# Patient Record
Sex: MALE | Race: WHITE | NOT HISPANIC OR LATINO | Employment: FULL TIME | URBAN - METROPOLITAN AREA
[De-identification: names, ages, dates, MRNs, and addresses within clinical notes are randomized per-mention and may not be internally consistent; named-entity substitution may affect disease eponyms.]

---

## 2018-06-21 ENCOUNTER — OFFICE VISIT (OUTPATIENT)
Dept: URGENT CARE | Facility: CLINIC | Age: 33
End: 2018-06-21
Payer: COMMERCIAL

## 2018-06-21 VITALS
DIASTOLIC BLOOD PRESSURE: 80 MMHG | TEMPERATURE: 98.2 F | HEART RATE: 82 BPM | SYSTOLIC BLOOD PRESSURE: 122 MMHG | RESPIRATION RATE: 18 BRPM | WEIGHT: 188 LBS | OXYGEN SATURATION: 98 % | BODY MASS INDEX: 28.49 KG/M2 | HEIGHT: 68 IN

## 2018-06-21 DIAGNOSIS — R11.2 NON-INTRACTABLE VOMITING WITH NAUSEA, UNSPECIFIED VOMITING TYPE: Primary | ICD-10-CM

## 2018-06-21 PROCEDURE — 99283 EMERGENCY DEPT VISIT LOW MDM: CPT | Performed by: FAMILY MEDICINE

## 2018-06-21 PROCEDURE — G0382 LEV 3 HOSP TYPE B ED VISIT: HCPCS | Performed by: FAMILY MEDICINE

## 2018-06-21 NOTE — LETTER
June 21, 2018     Patient: Samreen Romero   YOB: 1985   Date of Visit: 6/21/2018       To Whom It May Concern: It is my medical opinion that Alison Palafox may return to work on 06/22/2018  If you have any questions or concerns, please don't hesitate to call           Sincerely,        Cathy Hill MD    CC: No Recipients

## 2018-06-21 NOTE — PATIENT INSTRUCTIONS
As we discussed, it does not appear that the tick you picked off your body had time to feed  It is highly unlikely that it had time to transmit Lyme  With regard to the GI bug, it appears that it has pretty much run its course  Be sure to stay well hydrated

## 2018-06-21 NOTE — PROGRESS NOTES
Assessment/Plan:      Diagnoses and all orders for this visit:    Non-intractable vomiting with nausea, unspecified vomiting type          Subjective:     Patient ID: Tai Galarza is a 29 y o  male  Patient is a 80-year-old male who presents with the a GI bug which began yesterday  States he vomited twice yesterday  This morning he was nauseated but was able to keep the the flat soda down  He also picked a tick off his scrotum which he believes had just attached itself  It was very small, and was not engorged  Review of Systems   Constitutional: Negative  HENT: Negative  Eyes: Negative  Respiratory: Negative  Gastrointestinal: Positive for nausea  Objective:     Physical Exam   Constitutional: He is oriented to person, place, and time  He appears well-developed and well-nourished  HENT:   Head: Normocephalic and atraumatic  Eyes: Conjunctivae are normal    Pulmonary/Chest: Effort normal    Musculoskeletal: Normal range of motion  Neurological: He is alert and oriented to person, place, and time

## 2018-07-29 ENCOUNTER — OFFICE VISIT (OUTPATIENT)
Dept: URGENT CARE | Facility: CLINIC | Age: 33
End: 2018-07-29
Payer: COMMERCIAL

## 2018-07-29 VITALS
OXYGEN SATURATION: 99 % | HEART RATE: 85 BPM | BODY MASS INDEX: 27.85 KG/M2 | HEIGHT: 69 IN | WEIGHT: 188 LBS | SYSTOLIC BLOOD PRESSURE: 120 MMHG | TEMPERATURE: 99.1 F | RESPIRATION RATE: 16 BRPM | DIASTOLIC BLOOD PRESSURE: 82 MMHG

## 2018-07-29 DIAGNOSIS — T63.481A INSECT STINGS, ACCIDENTAL OR UNINTENTIONAL, INITIAL ENCOUNTER: Primary | ICD-10-CM

## 2018-07-29 PROCEDURE — 96372 THER/PROPH/DIAG INJ SC/IM: CPT | Performed by: NURSE PRACTITIONER

## 2018-07-29 PROCEDURE — G0382 LEV 3 HOSP TYPE B ED VISIT: HCPCS | Performed by: NURSE PRACTITIONER

## 2018-07-29 PROCEDURE — 99283 EMERGENCY DEPT VISIT LOW MDM: CPT | Performed by: NURSE PRACTITIONER

## 2018-07-29 RX ORDER — DIPHENHYDRAMINE HYDROCHLORIDE 50 MG/ML
50 INJECTION INTRAMUSCULAR; INTRAVENOUS ONCE
Status: COMPLETED | OUTPATIENT
Start: 2018-07-29 | End: 2018-07-29

## 2018-07-29 RX ORDER — METHYLPREDNISOLONE SODIUM SUCCINATE 125 MG/2ML
125 INJECTION, POWDER, LYOPHILIZED, FOR SOLUTION INTRAMUSCULAR; INTRAVENOUS ONCE
Status: COMPLETED | OUTPATIENT
Start: 2018-07-29 | End: 2018-07-29

## 2018-07-29 RX ADMIN — METHYLPREDNISOLONE SODIUM SUCCINATE 125 MG: 125 INJECTION, POWDER, LYOPHILIZED, FOR SOLUTION INTRAMUSCULAR; INTRAVENOUS at 16:36

## 2018-07-29 RX ADMIN — DIPHENHYDRAMINE HYDROCHLORIDE 50 MG: 50 INJECTION INTRAMUSCULAR; INTRAVENOUS at 16:38

## 2018-07-29 NOTE — PROGRESS NOTES
NAME: Mona Engel is a 29 y o  male  : 1985    MRN: [id number]      Assessment and Plan   Insect stings, accidental or unintentional, initial encounter [T63 341A]  1  Insect stings, accidental or unintentional, initial encounter  methylPREDNISolone sodium succinate (Solu-MEDROL) injection 125 mg    diphenhydrAMINE (BENADRYL) injection 50 mg     Administrations This Visit     diphenhydrAMINE (BENADRYL) injection 50 mg     Admin Date  2018 Action  Given Dose  50 mg Route  Intramuscular Administered By  Audra Walker RN          methylPREDNISolone sodium succinate (Solu-MEDROL) injection 125 mg     Admin Date  2018 Action  Given Dose  125 mg Route  Intramuscular Administered By  Audra Walker, SAMUEL Patel was seen today for insect bite  Diagnoses and all orders for this visit:    Insect stings, accidental or unintentional, initial encounter  -     methylPREDNISolone sodium succinate (Solu-MEDROL) injection 125 mg; Inject 2 mL (125 mg total) into a muscle once   -     diphenhydrAMINE (BENADRYL) injection 50 mg; Inject 1 mL (50 mg total) into a muscle once     pt tolerated both shot, waited about 20 min after injections with no side effects  Pt aware not to drive with benadryl in system and wife driving patient home    Patient Instructions   Patient Instructions   Follow up with pcp   Take meds as directed   Steroid injection was given at 445pm and bendryl 50mg was given at 445pm  If symptoms worsen go to the ER  Monitor the right upper arm for worsening signs   Trouble breathing, talking or having chest discomfort go to the ER or call 911 immediately  Right upper arm was outlined with a skin marker  Take zyrtec or allegra in the morning      Proceed to ER if symptoms worsen      Chief Complaint     Chief Complaint   Patient presents with    Insect Bite     x4, today 1030; RUE and R jaw; redness, swelling; pt denies any issues with breathing; no OTC meds History of Present Illness     Patient here today for right arm pain after being stung by 4 bees  He has been stung many times before in the past and no reactions  He has no SOB, Cp, wheezing or difficulty swallowing, however the right arm is red warm and swollen  Pt was stung around 10AM this morning when doing work out side and outline with a pen where the redness stopped but as a few hours went by he noticed that it has gotten bigger  He has no fever, has no other symptoms  The area of the right shoulder and upper arm are red, warm, and swollen  He has good ROM of the RUE  Wife is here today with his two younger kids in car waiting  Pt is not driving home  Review of Systems   Review of Systems   Skin: Positive for wound  Current Medications     No current outpatient prescriptions on file  No current facility-administered medications for this visit  Current Allergies     Allergies as of 07/29/2018    (No Known Allergies)              History reviewed  No pertinent past medical history  Past Surgical History:   Procedure Laterality Date    DENTAL SURGERY      VASECTOMY  2018       No family history on file  Medications have been verified  The following portions of the patient's history were reviewed and updated as appropriate: allergies, current medications, past family history, past medical history, past social history, past surgical history and problem list     Objective   /82   Pulse 85   Temp 99 1 °F (37 3 °C) (Tympanic)   Resp 16   Ht 5' 9" (1 753 m)   Wt 85 3 kg (188 lb)   SpO2 99%   BMI 27 76 kg/m²      Physical Exam     Physical Exam   Constitutional: He appears well-developed and well-nourished  Musculoskeletal:        Right shoulder: He exhibits tenderness and swelling  He exhibits normal range of motion and no pain  Arms:  Skin:        prior to leaving and after meds, redness of the right upper arm started to fade in color      Rachael Carter, CRNP

## 2018-07-29 NOTE — LETTER
July 29, 2018     Patient: Dmitriy Jackson   YOB: 1985   Date of Visit: 7/29/2018       To Whom It May Concern: It is my medical opinion that Mickeal Mom may return to work on 07/31/2018  If you have any questions or concerns, please don't hesitate to call           Sincerely,        PRASAD Lee    CC: No Recipients

## 2018-07-29 NOTE — PATIENT INSTRUCTIONS
Follow up with pcp   Take meds as directed   Steroid injection was given at 445pm and bendryl 50mg was given at 445pm  If symptoms worsen go to the ER  Monitor the right upper arm for worsening signs   Trouble breathing, talking or having chest discomfort go to the ER or call 911 immediately  Right upper arm was outlined with a skin marker  Take zyrtec or allegra in the morning

## 2019-08-08 ENCOUNTER — OFFICE VISIT (OUTPATIENT)
Dept: FAMILY MEDICINE CLINIC | Facility: CLINIC | Age: 34
End: 2019-08-08
Payer: COMMERCIAL

## 2019-08-08 VITALS
HEIGHT: 69 IN | TEMPERATURE: 96.8 F | WEIGHT: 194 LBS | SYSTOLIC BLOOD PRESSURE: 102 MMHG | RESPIRATION RATE: 18 BRPM | DIASTOLIC BLOOD PRESSURE: 82 MMHG | BODY MASS INDEX: 28.73 KG/M2 | HEART RATE: 60 BPM

## 2019-08-08 DIAGNOSIS — F41.9 ANXIETY: ICD-10-CM

## 2019-08-08 DIAGNOSIS — R63.5 WEIGHT GAIN: ICD-10-CM

## 2019-08-08 DIAGNOSIS — Z13.6 SCREENING FOR CARDIOVASCULAR CONDITION: Primary | ICD-10-CM

## 2019-08-08 PROCEDURE — 3008F BODY MASS INDEX DOCD: CPT | Performed by: NURSE PRACTITIONER

## 2019-08-08 PROCEDURE — 1036F TOBACCO NON-USER: CPT | Performed by: NURSE PRACTITIONER

## 2019-08-08 PROCEDURE — 99203 OFFICE O/P NEW LOW 30 MIN: CPT | Performed by: NURSE PRACTITIONER

## 2019-08-08 RX ORDER — ESCITALOPRAM OXALATE 5 MG/1
5 TABLET ORAL DAILY
Qty: 30 TABLET | Refills: 2 | Status: SHIPPED | OUTPATIENT
Start: 2019-08-08 | End: 2019-10-14

## 2019-08-08 NOTE — PROGRESS NOTES
Assessment/Plan   Diagnoses and all orders for this visit:    Screening for cardiovascular condition  -     CBC and differential; Future  -     Comprehensive metabolic panel; Future  -     Lipid panel; Future  -     CBC and differential  -     Comprehensive metabolic panel  -     Lipid panel    Weight gain  -     TSH, 3rd generation with Free T4 reflex; Future  -     TSH, 3rd generation with Free T4 reflex    Anxiety  -     escitalopram (LEXAPRO) 5 mg tablet; Take 1 tablet (5 mg total) by mouth daily        Chief Complaint   Patient presents with    Establish Care     Problem:  Anxiety       Subjective   Patient ID: Dimitrios Nino is a 36 y o  male  Vitals:    08/08/19 1433   BP: 102/82   Pulse: 60   Resp: 18   Temp: (!) 96 8 °F (36 °C)     Here today to establish care with this practice  Reports a past history of anxiety without depression  At "one time I was on medications " but only a short time and 'no one regulated the dose" so "I went off  Of it"  Reports his spouse is a patient at this practice who is also being seen for anxiety  He is a , works third shift  And has 4 children  "life gets a little stressful at times"  He is deferring counselor at this time but will consider it in the future once he is established on medications  He has no other c/o at this time, reports be is trying to loose weight with healthy eating  Anxiety   Presents for initial visit  Onset was 6 to 12 months ago  The problem has been unchanged  Symptoms include decreased concentration, dizziness (at times"feel a slight dizzy " with turning of head, only lasts a second or two and resolves), excessive worry, feeling of choking, irritability, nervous/anxious behavior, palpitations and restlessness  Patient reports no chest pain, compulsions, confusion, depressed mood, impotence, insomnia, muscle tension, nausea, shortness of breath or suicidal ideas  Symptoms occur most days  The severity of symptoms is moderate  The symptoms are aggravated by family issues, work stress and caffeine  The patient sleeps 8 hours per night  The quality of sleep is good  Nighttime awakenings: none  There are no known risk factors  His past medical history is significant for anxiety/panic attacks  There is no history of anemia, fibromyalgia, hyperthyroidism or suicide attempts  Past treatments include SSRIs  The treatment provided mild relief  Compliance with prior treatments has been variable  The following portions of the patient's history were reviewed and updated as appropriate: allergies, current medications, past family history, past medical history, past social history and problem list     Review of Systems   Constitutional: Positive for irritability  Negative for fatigue and fever  HENT: Negative  Eyes: Negative  Respiratory: Negative  Negative for shortness of breath  Cardiovascular: Positive for palpitations  Negative for chest pain  Gastrointestinal: Negative  Negative for nausea  Endocrine: Negative  Genitourinary: Negative  Negative for impotence  Musculoskeletal: Negative  Negative for arthralgias and myalgias  Skin: Negative  Allergic/Immunologic: Negative  Neurological: Positive for dizziness (at times"feel a slight dizzy " with turning of head, only lasts a second or two and resolves)  Hematological: Negative  Psychiatric/Behavioral: Positive for decreased concentration  Negative for confusion and suicidal ideas  The patient is nervous/anxious  The patient does not have insomnia  Objective     Physical Exam   Constitutional: He is oriented to person, place, and time  He appears well-developed and well-nourished  No distress  HENT:   Head: Normocephalic and atraumatic  Right Ear: External ear normal    Left Ear: External ear normal    Nose: Nose normal    Mouth/Throat: Oropharynx is clear and moist  No oropharyngeal exudate     Eyes: Pupils are equal, round, and reactive to light  Conjunctivae and EOM are normal  Right eye exhibits no discharge  Left eye exhibits no discharge  Neck: Normal range of motion  Neck supple  Cardiovascular: Normal rate, regular rhythm, normal heart sounds and intact distal pulses  No murmur heard  Pulmonary/Chest: Effort normal and breath sounds normal  No respiratory distress  Abdominal: Soft  Bowel sounds are normal  He exhibits no distension and no mass  There is no tenderness  There is no rebound and no guarding  Musculoskeletal: Normal range of motion  He exhibits no edema, tenderness or deformity  Neurological: He is alert and oriented to person, place, and time  He displays normal reflexes  No cranial nerve deficit  He exhibits normal muscle tone  Coordination normal    Skin: Skin is warm and dry  Capillary refill takes less than 2 seconds  He is not diaphoretic  No erythema  Psychiatric: He has a normal mood and affect  Judgment and thought content normal    Nursing note and vitals reviewed    No Known Allergies

## 2019-08-08 NOTE — PATIENT INSTRUCTIONS
Anxiety   WHAT YOU NEED TO KNOW:   Anxiety is a condition that causes you to feel extremely worried or nervous  The feelings are so strong that they can cause problems with your daily activities or sleep  Anxiety may be triggered by something you fear, or it may happen without a cause  Family or work stress, smoking, caffeine, and alcohol can increase your risk for anxiety  Certain medicines or health conditions can also increase your risk  Anxiety can become a long-term condition if it is not managed or treated  DISCHARGE INSTRUCTIONS:   Call 911 if:   · You have chest pain, tightness, or heaviness that may spread to your shoulders, arms, jaw, neck, or back  · You feel like hurting yourself or someone else  Contact your healthcare provider if:   · Your symptoms get worse or do not get better with treatment  · Your anxiety keeps you from doing your regular daily activities  · You have new symptoms since your last visit  · You have questions or concerns about your condition or care  Medicines:   · Medicines  may be given to help you feel more calm and relaxed, and decrease your symptoms  · Take your medicine as directed  Contact your healthcare provider if you think your medicine is not helping or if you have side effects  Tell him of her if you are allergic to any medicine  Keep a list of the medicines, vitamins, and herbs you take  Include the amounts, and when and why you take them  Bring the list or the pill bottles to follow-up visits  Carry your medicine list with you in case of an emergency  Follow up with your healthcare provider within 2 weeks or as directed:  Write down your questions so you remember to ask them during your visits  Manage anxiety:   · Talk to someone about your anxiety  Your healthcare provider may suggest counseling  Cognitive behavioral therapy can help you understand and change how you react to events that trigger your symptoms   You might feel more comfortable talking with a friend or family member about your anxiety  Choose someone you know will be supportive and encouraging  · Find ways to relax  Activities such as exercise, meditation, or listening to music can help you relax  Spend time with friends, or do things you enjoy  · Practice deep breathing  Deep breathing can help you relax when you feel anxious  Focus on taking slow, deep breaths several times a day, or during an anxiety attack  Breathe in through your nose and out through your mouth  · Create a regular sleep routine  Regular sleep can help you feel calmer during the day  Go to sleep and wake up at the same times every day  Do not watch television or use the computer right before bed  Your room should be comfortable, dark, and quiet  · Eat a variety of healthy foods  Healthy foods include fruits, vegetables, low-fat dairy products, lean meats, fish, whole-grain breads, and cooked beans  Healthy foods can help you feel less anxious and have more energy  · Exercise regularly  Exercise can increase your energy level  Exercise may also lift your mood and help you sleep better  Your healthcare provider can help you create an exercise plan  · Do not smoke  Nicotine and other chemicals in cigarettes and cigars can increase anxiety  Ask your healthcare provider for information if you currently smoke and need help to quit  E-cigarettes or smokeless tobacco still contain nicotine  Talk to your healthcare provider before you use these products  · Do not have caffeine  Caffeine can make your symptoms worse  Do not have foods or drinks that are meant to increase your energy level  · Limit or do not drink alcohol  Ask your healthcare provider if alcohol is safe for you  You may not be able to drink alcohol if you take certain anxiety or depression medicines  Limit alcohol to 1 drink per day if you are a woman  Limit alcohol to 2 drinks per day if you are a man   A drink of alcohol is 12 ounces of beer, 5 ounces of wine, or 1½ ounces of liquor  · Do not use drugs  Drugs can make your anxiety worse  It can also make anxiety hard to manage  Talk to your healthcare provider if you use drugs and want help to quit  © 2017 [address] [employer] Information is for End User's use only and may not be sold, redistributed or otherwise used for commercial purposes  All illustrations and images included in CareNotes® are the copyrighted property of A D A M , [employer]  or Karsten Rosario  The above information is an  only  It is not intended as medical advice for individual conditions or treatments  Talk to your doctor, nurse or pharmacist before following any medical regimen to see if it is safe and effective for you

## 2019-08-30 ENCOUNTER — OFFICE VISIT (OUTPATIENT)
Dept: FAMILY MEDICINE CLINIC | Facility: CLINIC | Age: 34
End: 2019-08-30
Payer: COMMERCIAL

## 2019-08-30 VITALS
HEART RATE: 71 BPM | HEIGHT: 69 IN | OXYGEN SATURATION: 96 % | BODY MASS INDEX: 29.51 KG/M2 | DIASTOLIC BLOOD PRESSURE: 70 MMHG | TEMPERATURE: 98.2 F | WEIGHT: 199.2 LBS | SYSTOLIC BLOOD PRESSURE: 100 MMHG

## 2019-08-30 DIAGNOSIS — Z98.52 S/P VASECTOMY: ICD-10-CM

## 2019-08-30 DIAGNOSIS — F41.9 ANXIETY: Primary | ICD-10-CM

## 2019-08-30 PROCEDURE — 99213 OFFICE O/P EST LOW 20 MIN: CPT | Performed by: NURSE PRACTITIONER

## 2019-08-30 NOTE — PROGRESS NOTES
Assessment/Plan   Diagnoses and all orders for this visit:    Anxiety  Comments:  continue with lexapro 5 mg PO daily, follow up in 4 weeks    S/P vasectomy  -     Ambulatory referral to Urology; Future        Chief Complaint   Patient presents with    Follow-up     Follow up Anxiety       Subjective   Patient ID: Dionicio López is a 36 y o  male  Vitals:    08/30/19 1442   BP: 100/70   Pulse: 71   Temp: 98 2 °F (36 8 °C)   SpO2: 96%     Patient reports had a  vasectomy 2 years ago and has found that when he has increasing number of sexual intercourse with his wife he feels "a build up of pressure" in his right testicle, it is not painful,  Nor swollen, none today  Will send to DR Slime Pink for evaulation    Anxiety   Presents for follow-up visit  Symptoms include decreased concentration, insomnia and nervous/anxious behavior  Patient reports no irritability, malaise, muscle tension or suicidal ideas  Primary symptoms comment: "feeling must improved" "the little things have stopped bothering me"  Symptoms occur occasionally  The most recent episode lasted 2 hours  The severity of symptoms is moderate  The quality of sleep is good  Nighttime awakenings: none  Compliance with medications is %  Treatment side effects: none  The following portions of the patient's history were reviewed and updated as appropriate: allergies, current medications, past medical history, past social history, past surgical history and problem list     Review of Systems   Constitutional: Negative  Negative for fatigue, fever and irritability  HENT: Negative  Eyes: Negative  Respiratory: Negative  Cardiovascular: Negative  Gastrointestinal: Negative  Endocrine: Negative  Genitourinary: Negative  Musculoskeletal: Negative  Skin: Negative  Allergic/Immunologic: Negative  Neurological: Negative  Hematological: Negative  Psychiatric/Behavioral: Positive for decreased concentration  Negative for self-injury, sleep disturbance and suicidal ideas  The patient is nervous/anxious and has insomnia  Objective     Physical Exam   Constitutional: He is oriented to person, place, and time  He appears well-developed and well-nourished  No distress  HENT:   Head: Normocephalic and atraumatic  Mouth/Throat: No oropharyngeal exudate  Eyes: Conjunctivae are normal  Right eye exhibits no discharge  Left eye exhibits no discharge  Neck: Normal range of motion  Neck supple  No thyromegaly present  Cardiovascular: Normal rate, regular rhythm, normal heart sounds and intact distal pulses  No murmur heard  Pulmonary/Chest: Effort normal and breath sounds normal  No respiratory distress  Abdominal: Soft  Bowel sounds are normal  He exhibits no distension  There is no rebound and no guarding  Musculoskeletal: Normal range of motion  He exhibits no edema or tenderness  Lymphadenopathy:     He has no cervical adenopathy  Neurological: He is alert and oriented to person, place, and time  Skin: Skin is warm and dry  Capillary refill takes less than 2 seconds  He is not diaphoretic  No erythema  No pallor  Psychiatric: He has a normal mood and affect  His behavior is normal  Judgment and thought content normal    Nursing note and vitals reviewed  No Known Allergies  There is no problem list on file for this patient

## 2019-10-14 ENCOUNTER — OFFICE VISIT (OUTPATIENT)
Dept: FAMILY MEDICINE CLINIC | Facility: CLINIC | Age: 34
End: 2019-10-14
Payer: COMMERCIAL

## 2019-10-14 VITALS
TEMPERATURE: 96.9 F | SYSTOLIC BLOOD PRESSURE: 116 MMHG | BODY MASS INDEX: 29.47 KG/M2 | HEART RATE: 73 BPM | WEIGHT: 199 LBS | DIASTOLIC BLOOD PRESSURE: 78 MMHG | RESPIRATION RATE: 15 BRPM | HEIGHT: 69 IN | OXYGEN SATURATION: 98 %

## 2019-10-14 DIAGNOSIS — F41.9 ANXIETY: Primary | ICD-10-CM

## 2019-10-14 PROCEDURE — 3008F BODY MASS INDEX DOCD: CPT | Performed by: NURSE PRACTITIONER

## 2019-10-14 PROCEDURE — 99213 OFFICE O/P EST LOW 20 MIN: CPT | Performed by: NURSE PRACTITIONER

## 2019-10-14 RX ORDER — ESCITALOPRAM OXALATE 10 MG/1
10 TABLET ORAL DAILY
Qty: 30 TABLET | Refills: 1 | Status: SHIPPED | OUTPATIENT
Start: 2019-10-14 | End: 2020-01-08

## 2019-10-14 NOTE — PROGRESS NOTES
Assessment/Plan   Diagnoses and all orders for this visit:    Anxiety  -     Ambulatory referral to [facility]; Future  -     escitalopram (LEXAPRO) 10 mg tablet; Take 1 tablet (10 mg total) by mouth daily        Chief Complaint   Patient presents with    Follow-up     1 month follow up for anxiety  Subjective   Patient ID: Oneal Quinones is a 36 y o  male  There were no vitals filed for this visit  Reports having increased "issues", step dad commited suicide  1 year ago, beginning to have marital problems now due to my problems     Anxiety   Presents for follow-up visit  Symptoms include confusion, decreased concentration, depressed mood, irritability and nervous/anxious behavior  Patient reports no chest pain, dizziness, insomnia or suicidal ideas  Symptoms occur most days  The most recent episode lasted 2 hours  The severity of symptoms is moderate  The quality of sleep is fair  Nighttime awakenings: occasional      Compliance with medications is %  Treatment side effects: none        The following portions of the patient's history were reviewed and updated as appropriate: allergies, current medications, past family history, past social history, past surgical history and problem list     Review of Systems   Constitutional: Positive for irritability  HENT: Negative  Eyes: Negative  Respiratory: Negative  Cardiovascular: Negative  Negative for chest pain  Gastrointestinal: Negative  Endocrine: Negative  Genitourinary: Negative  Musculoskeletal: Negative  Skin: Negative  Allergic/Immunologic: Negative  Neurological: Negative  Negative for dizziness  Hematological: Negative  Psychiatric/Behavioral: Positive for confusion and decreased concentration  Negative for suicidal ideas  The patient is nervous/anxious  The patient does not have insomnia  Objective     Physical Exam   Constitutional: He is oriented to person, place, and time   He appears well-developed and well-nourished  No distress  HENT:   Head: Normocephalic and atraumatic  Eyes: Conjunctivae are normal  Right eye exhibits no discharge  Left eye exhibits no discharge  Neck: Normal range of motion  Neck supple  No thyromegaly present  Cardiovascular: Normal rate, regular rhythm, normal heart sounds and intact distal pulses  No murmur heard  Pulmonary/Chest: Effort normal and breath sounds normal  He has no wheezes  Abdominal: Soft  Bowel sounds are normal    Musculoskeletal: Normal range of motion  He exhibits no edema, tenderness or deformity  Lymphadenopathy:     He has no cervical adenopathy  Neurological: He is alert and oriented to person, place, and time  Skin: Skin is warm and dry  Capillary refill takes less than 2 seconds  No rash noted  He is not diaphoretic  No erythema  No pallor  Psychiatric: He has a normal mood and affect  His behavior is normal  Judgment and thought content normal    Nursing note and vitals reviewed    No Known Allergies

## 2019-11-08 ENCOUNTER — OFFICE VISIT (OUTPATIENT)
Dept: FAMILY MEDICINE CLINIC | Facility: CLINIC | Age: 34
End: 2019-11-08
Payer: COMMERCIAL

## 2019-11-08 VITALS
OXYGEN SATURATION: 97 % | TEMPERATURE: 97.1 F | RESPIRATION RATE: 15 BRPM | SYSTOLIC BLOOD PRESSURE: 120 MMHG | HEIGHT: 69 IN | WEIGHT: 200.2 LBS | DIASTOLIC BLOOD PRESSURE: 76 MMHG | HEART RATE: 70 BPM | BODY MASS INDEX: 29.65 KG/M2

## 2019-11-08 DIAGNOSIS — R51.9 ACUTE NONINTRACTABLE HEADACHE, UNSPECIFIED HEADACHE TYPE: ICD-10-CM

## 2019-11-08 DIAGNOSIS — J02.9 SORE THROAT: Primary | ICD-10-CM

## 2019-11-08 LAB — S PYO AG THROAT QL: NEGATIVE

## 2019-11-08 PROCEDURE — 99214 OFFICE O/P EST MOD 30 MIN: CPT | Performed by: NURSE PRACTITIONER

## 2019-11-08 PROCEDURE — 87880 STREP A ASSAY W/OPTIC: CPT | Performed by: NURSE PRACTITIONER

## 2019-11-08 PROCEDURE — 1036F TOBACCO NON-USER: CPT | Performed by: NURSE PRACTITIONER

## 2019-11-08 RX ORDER — AMOXICILLIN 875 MG/1
875 TABLET, COATED ORAL 2 TIMES DAILY
Qty: 14 TABLET | Refills: 0 | Status: SHIPPED | OUTPATIENT
Start: 2019-11-08 | End: 2019-11-15

## 2019-11-08 RX ORDER — NAPROXEN 500 MG/1
500 TABLET ORAL 2 TIMES DAILY WITH MEALS
Qty: 20 TABLET | Refills: 0 | Status: SHIPPED | OUTPATIENT
Start: 2019-11-08 | End: 2020-01-08 | Stop reason: ALTCHOICE

## 2019-11-08 NOTE — PROGRESS NOTES
Assessment/Plan   Problem List Items Addressed This Visit     None      Visit Diagnoses     Sore throat    -  Primary    Relevant Medications    amoxicillin (AMOXIL) 875 mg tablet    Other Relevant Orders    POCT rapid strepA (Completed)    Throat culture    Acute nonintractable headache, unspecified headache type        Relevant Medications    naproxen (NAPROSYN) 500 mg tablet                Chief Complaint   Patient presents with    Follow-up     for anxiety-pt has not taking lexapro in 2 weeks        Subjective   Patient ID: Carlos Doshi is a 36 y o  male  Vitals:    11/08/19 0937   BP: 120/76   Pulse: 70   Resp: 15   Temp: (!) 97 1 °F (36 2 °C)   SpO2: 97%     Reports several weeks ago began experiencing hot flashes and chills, sweating and thought it was from his Lexapro which she stopped  But he noticed that his symptoms only increased in severity without the Lexapro  He had 1 day of loose stools, felt dizzy, spent half a day in bed and then felt a little better  Until 4 days ago when he began with 1 day of fever, headaches, generalized achiness, and a sore throat  Headache    This is a new problem  The current episode started in the past 7 days (Four days ago)  The problem occurs intermittently  The problem has been waxing and waning  The pain is located in the frontal region  The pain does not radiate  The pain quality is not similar to prior headaches (No history of migraines)  The quality of the pain is described as throbbing  The pain is at a severity of 6/10  The pain is moderate  Associated symptoms include dizziness, a fever, muscle aches, neck pain (Left side of his neck), a sore throat and swollen glands   Pertinent negatives include no abdominal pain, abnormal behavior, back pain, blurred vision, coughing, drainage, ear pain, facial sweating, hearing loss, insomnia, loss of balance, nausea, phonophobia, photophobia, scalp tenderness, seizures, sinus pressure, tingling, tinnitus or weakness  Nothing aggravates the symptoms  Treatments tried: Advil migraine  The treatment provided moderate (But wore off in several hours) relief  There is no history of cancer, cluster headaches, hypertension, immunosuppression, migraine headaches, obesity, recent head traumas or sinus disease  The following portions of the patient's history were reviewed and updated as appropriate: allergies, current medications, past medical history, past social history, past surgical history and problem list     Review of Systems   Constitutional: Positive for appetite change, chills, fatigue and fever  HENT: Positive for sore throat  Negative for congestion, ear pain, hearing loss, postnasal drip, sinus pressure, sinus pain, tinnitus and trouble swallowing  Eyes: Negative  Negative for blurred vision and photophobia  Respiratory: Negative  Negative for cough  Cardiovascular: Negative  Gastrointestinal: Positive for abdominal distention and diarrhea (1 day )  Negative for abdominal pain and nausea  Endocrine: Negative  Genitourinary: Negative  Musculoskeletal: Positive for myalgias and neck pain (Left side of his neck)  Negative for back pain  Skin: Negative  Allergic/Immunologic: Negative  Neurological: Positive for dizziness and headaches  Negative for tingling, seizures, weakness and loss of balance  Hematological: Negative  Psychiatric/Behavioral: The patient is nervous/anxious  The patient does not have insomnia  Objective     Physical Exam   Constitutional: He is oriented to person, place, and time  He appears well-developed and well-nourished  No distress  HENT:   Head: Normocephalic and atraumatic  Right Ear: External ear normal    Left Ear: External ear normal    Nose: Nose normal    Mouth/Throat: Oropharyngeal exudate and posterior oropharyngeal erythema present  Tonsils are 1+ on the right  Tonsils are 1+ on the left  No tonsillar exudate     Eyes: Pupils are equal, round, and reactive to light  Conjunctivae and EOM are normal  Right eye exhibits no discharge  Left eye exhibits no discharge  Neck: Normal range of motion  Neck supple  No thyromegaly present  Cardiovascular: Normal rate, regular rhythm, normal heart sounds and intact distal pulses  Exam reveals no friction rub  No murmur heard  Pulmonary/Chest: Effort normal and breath sounds normal  No respiratory distress  He has no wheezes  Abdominal: Soft  Bowel sounds are normal  He exhibits no distension and no mass  There is no tenderness  There is no rebound and no guarding  Musculoskeletal: He exhibits tenderness  He exhibits no edema  Lymphadenopathy:     He has cervical adenopathy  Neurological: He is alert and oriented to person, place, and time  No cranial nerve deficit  He exhibits normal muscle tone  Coordination normal    Skin: Skin is warm and dry  Capillary refill takes less than 2 seconds  No rash noted  He is not diaphoretic  No erythema  No pallor  Psychiatric: He has a normal mood and affect  His behavior is normal  Judgment and thought content normal    Nursing note and vitals reviewed    No Known Allergies

## 2019-11-08 NOTE — PATIENT INSTRUCTIONS
1  Antibiotic as prescribed- will call with lab work   2   Try using naproxen 500 mg twice daily with food for next few days and monitor headaches

## 2019-11-09 DIAGNOSIS — M79.10 MYALGIA: Primary | ICD-10-CM

## 2019-11-09 LAB
ALBUMIN SERPL-MCNC: 4.2 G/DL (ref 3.6–5.1)
ALBUMIN/GLOB SERPL: 1.4 (CALC) (ref 1–2.5)
ALP SERPL-CCNC: 97 U/L (ref 40–115)
ALT SERPL-CCNC: 55 U/L (ref 9–46)
AST SERPL-CCNC: 34 U/L (ref 10–40)
BASOPHILS # BLD AUTO: 20 CELLS/UL (ref 0–200)
BASOPHILS NFR BLD AUTO: 0.4 %
BILIRUB SERPL-MCNC: 0.6 MG/DL (ref 0.2–1.2)
BUN SERPL-MCNC: 10 MG/DL (ref 7–25)
BUN/CREAT SERPL: ABNORMAL (CALC) (ref 6–22)
CALCIUM SERPL-MCNC: 8.7 MG/DL (ref 8.6–10.3)
CHLORIDE SERPL-SCNC: 101 MMOL/L (ref 98–110)
CHOLEST SERPL-MCNC: 144 MG/DL
CHOLEST/HDLC SERPL: 3.8 (CALC)
CO2 SERPL-SCNC: 29 MMOL/L (ref 20–32)
CREAT SERPL-MCNC: 0.72 MG/DL (ref 0.6–1.35)
EOSINOPHIL # BLD AUTO: 20 CELLS/UL (ref 15–500)
EOSINOPHIL NFR BLD AUTO: 0.4 %
ERYTHROCYTE [DISTWIDTH] IN BLOOD BY AUTOMATED COUNT: 12.7 % (ref 11–15)
GLOBULIN SER CALC-MCNC: 3.1 G/DL (CALC) (ref 1.9–3.7)
GLUCOSE SERPL-MCNC: 86 MG/DL (ref 65–99)
HCT VFR BLD AUTO: 45.4 % (ref 38.5–50)
HDLC SERPL-MCNC: 38 MG/DL
HGB BLD-MCNC: 15.1 G/DL (ref 13.2–17.1)
LDLC SERPL CALC-MCNC: 89 MG/DL (CALC)
LYMPHOCYTES # BLD AUTO: 1274 CELLS/UL (ref 850–3900)
LYMPHOCYTES NFR BLD AUTO: 26 %
MCH RBC QN AUTO: 27.8 PG (ref 27–33)
MCHC RBC AUTO-ENTMCNC: 33.3 G/DL (ref 32–36)
MCV RBC AUTO: 83.5 FL (ref 80–100)
MONOCYTES # BLD AUTO: 696 CELLS/UL (ref 200–950)
MONOCYTES NFR BLD AUTO: 14.2 %
NEUTROPHILS # BLD AUTO: 2891 CELLS/UL (ref 1500–7800)
NEUTROPHILS NFR BLD AUTO: 59 %
NONHDLC SERPL-MCNC: 106 MG/DL (CALC)
PLATELET # BLD AUTO: 230 THOUSAND/UL (ref 140–400)
PMV BLD REES-ECKER: 10.6 FL (ref 7.5–12.5)
POTASSIUM SERPL-SCNC: 4 MMOL/L (ref 3.5–5.3)
PROT SERPL-MCNC: 7.3 G/DL (ref 6.1–8.1)
RBC # BLD AUTO: 5.44 MILLION/UL (ref 4.2–5.8)
SL AMB EGFR AFRICAN AMERICAN: 142 ML/MIN/1.73M2
SL AMB EGFR NON AFRICAN AMERICAN: 123 ML/MIN/1.73M2
SODIUM SERPL-SCNC: 137 MMOL/L (ref 135–146)
TRIGL SERPL-MCNC: 78 MG/DL
TSH SERPL-ACNC: 2 MIU/L (ref 0.4–4.5)
WBC # BLD AUTO: 4.9 THOUSAND/UL (ref 3.8–10.8)

## 2020-01-08 ENCOUNTER — OFFICE VISIT (OUTPATIENT)
Dept: FAMILY MEDICINE CLINIC | Facility: CLINIC | Age: 35
End: 2020-01-08
Payer: COMMERCIAL

## 2020-01-08 VITALS
DIASTOLIC BLOOD PRESSURE: 80 MMHG | BODY MASS INDEX: 30.42 KG/M2 | TEMPERATURE: 96.9 F | HEART RATE: 65 BPM | HEIGHT: 69 IN | SYSTOLIC BLOOD PRESSURE: 110 MMHG | WEIGHT: 205.4 LBS | OXYGEN SATURATION: 98 %

## 2020-01-08 DIAGNOSIS — F41.9 ANXIETY: Primary | ICD-10-CM

## 2020-01-08 PROCEDURE — 99214 OFFICE O/P EST MOD 30 MIN: CPT | Performed by: NURSE PRACTITIONER

## 2020-01-08 PROCEDURE — 3008F BODY MASS INDEX DOCD: CPT | Performed by: NURSE PRACTITIONER

## 2020-01-08 RX ORDER — ESCITALOPRAM OXALATE 20 MG/1
20 TABLET ORAL DAILY
Qty: 30 TABLET | Refills: 2 | Status: SHIPPED | OUTPATIENT
Start: 2020-01-08 | End: 2020-02-07

## 2020-01-08 RX ORDER — LORAZEPAM 0.5 MG/1
0.5 TABLET ORAL EVERY 8 HOURS PRN
Qty: 30 TABLET | Refills: 0 | Status: SHIPPED | OUTPATIENT
Start: 2020-01-08

## 2020-01-08 NOTE — PATIENT INSTRUCTIONS
Post Traumatic Stress Disorder   WHAT YOU NEED TO KNOW:   Post traumatic stress disorder (PTSD) is a condition that may occur after you have experienced a traumatic situation or event  This event may have caused you to feel intense fear, pain, or sorrow  You may think you are going to get hurt or die  You may also continue to feel helpless after the event  These feelings affect your daily activities and relationships  DISCHARGE INSTRUCTIONS:   Medicine:   · Antianxiety medicine: This medicine may be given to decrease anxiety and help you feel calm and relaxed  · Antidepressants: These medicines decrease or stop the symptoms of depression  · Sedative: This medicine is given to help you stay calm and relaxed  · Take your medicine as directed  Contact your healthcare provider if you think your medicine is not helping or if you have side effects  Tell him of her if you are allergic to any medicine  Keep a list of the medicines, vitamins, and herbs you take  Include the amounts, and when and why you take them  Bring the list or the pill bottles to follow-up visits  Carry your medicine list with you in case of an emergency  Follow up with your healthcare provider as directed:  Write down your questions so you remember to ask them during your visits  Self-care:   · Attend therapy sessions as directed: It is normal to have doubts or feel discomfort with your therapy  Tell your healthcare providers if you are uncomfortable or have questions about your therapies  · Get emotional support:  Spend time with family and friends and share your feelings with someone you trust  This extra support may help you feel better  · Get regular exercise:  Exercise may help to decrease stress  Ask your healthcare provider about the best exercise plan for you    For support and more information:   · [facility] Traumatic Stress Disorder  Phone: 1- [phone] - [phone]  Web Address: [email or web address]  · [address] [facility], Public Information & Communication Branch  [address], [address], [address]  [place] [address]   Phone: [phone]  Phone: [phone]  Web Address: [facility] tn  Contact your healthcare provider if:   · You cannot make it to your next appointment  · You cannot sleep or are sleeping too much  · You have questions or concerns about your condition or care  Return to the emergency department if:   · You think about hurting or killing yourself or someone else  © 2017 [facility] Information is for End User's use only and may not be sold, redistributed or otherwise used for commercial purposes  All illustrations and images included in CareNotes® are the copyrighted property of A D A M , Inc  or Karsten Rosario  The above information is an  only  It is not intended as medical advice for individual conditions or treatments  Talk to your doctor, nurse or pharmacist before following any medical regimen to see if it is safe and effective for you  Anxiety   WHAT YOU NEED TO KNOW:   Anxiety is a condition that causes you to feel extremely worried or nervous  The feelings are so strong that they can cause problems with your daily activities or sleep  Anxiety may be triggered by something you fear, or it may happen without a cause  Family or work stress, smoking, caffeine, and alcohol can increase your risk for anxiety  Certain medicines or health conditions can also increase your risk  Anxiety can become a long-term condition if it is not managed or treated  DISCHARGE INSTRUCTIONS:   Call 911 if:   · You have chest pain, tightness, or heaviness that may spread to your shoulders, arms, jaw, neck, or back  · You feel like hurting yourself or someone else    Contact your healthcare provider if:   · Your symptoms get worse or do not get better with treatment  · Your anxiety keeps you from doing your regular daily activities  · You have new symptoms since your last visit  · You have questions or concerns about your condition or care  Medicines:   · Medicines  may be given to help you feel more calm and relaxed, and decrease your symptoms  · Take your medicine as directed  Contact your healthcare provider if you think your medicine is not helping or if you have side effects  Tell him of her if you are allergic to any medicine  Keep a list of the medicines, vitamins, and herbs you take  Include the amounts, and when and why you take them  Bring the list or the pill bottles to follow-up visits  Carry your medicine list with you in case of an emergency  Follow up with your healthcare provider within 2 weeks or as directed:  Write down your questions so you remember to ask them during your visits  Manage anxiety:   · Talk to someone about your anxiety  Your healthcare provider may suggest counseling  Cognitive behavioral therapy can help you understand and change how you react to events that trigger your symptoms  You might feel more comfortable talking with a friend or family member about your anxiety  Choose someone you know will be supportive and encouraging  · Find ways to relax  Activities such as exercise, meditation, or listening to music can help you relax  Spend time with friends, or do things you enjoy  · Practice deep breathing  Deep breathing can help you relax when you feel anxious  Focus on taking slow, deep breaths several times a day, or during an anxiety attack  Breathe in through your nose and out through your mouth  · Create a regular sleep routine  Regular sleep can help you feel calmer during the day  Go to sleep and wake up at the same times every day  Do not watch television or use the computer right before bed  Your room should be comfortable, dark, and quiet  · Eat a variety of healthy foods    Healthy foods include fruits, vegetables, low-fat dairy products, lean meats, fish, whole-grain breads, and cooked beans  Healthy foods can help you feel less anxious and have more energy  · Exercise regularly  Exercise can increase your energy level  Exercise may also lift your mood and help you sleep better  Your healthcare provider can help you create an exercise plan  · Do not smoke  Nicotine and other chemicals in cigarettes and cigars can increase anxiety  Ask your healthcare provider for information if you currently smoke and need help to quit  E-cigarettes or smokeless tobacco still contain nicotine  Talk to your healthcare provider before you use these products  · Do not have caffeine  Caffeine can make your symptoms worse  Do not have foods or drinks that are meant to increase your energy level  · Limit or do not drink alcohol  Ask your healthcare provider if alcohol is safe for you  You may not be able to drink alcohol if you take certain anxiety or depression medicines  Limit alcohol to 1 drink per day if you are a woman  Limit alcohol to 2 drinks per day if you are a man  A drink of alcohol is 12 ounces of beer, 5 ounces of wine, or 1½ ounces of liquor  · Do not use drugs  Drugs can make your anxiety worse  It can also make anxiety hard to manage  Talk to your healthcare provider if you use drugs and want help to quit  © 2017 [address] [employer] Information is for End User's use only and may not be sold, redistributed or otherwise used for commercial purposes  All illustrations and images included in CareNotes® are the copyrighted property of A D A M , Inc  or Karsten Rosario  The above information is an  only  It is not intended as medical advice for individual conditions or treatments  Talk to your doctor, nurse or pharmacist before following any medical regimen to see if it is safe and effective for you

## 2020-01-08 NOTE — PROGRESS NOTES
Assessment/Plan   Problem List Items Addressed This Visit     None      Visit Diagnoses     Anxiety    -  Primary    Relevant Medications    escitalopram (LEXAPRO) 20 mg tablet    LORazepam (ATIVAN) 0 5 mg tablet                Chief Complaint   Patient presents with    Follow-up     discuss medications       Subjective   Patient ID: Anne Foley is a 36 y o  male  Vitals:    01/08/20 1728   BP: 110/80   Pulse: 65   Temp: (!) 96 9 °F (36 1 °C)   SpO2: 98%     Anxiety   Presents for follow-up (initially was on lexapro, but stopped and relealized it was helping and then started it again but feels he needs an increased dose) visit  Symptoms include decreased concentration, excessive worry, irritability, muscle tension and nervous/anxious behavior  Patient reports no depressed mood, dizziness, feeling of choking, hyperventilation, impotence, insomnia, obsessions, panic, restlessness, shortness of breath or suicidal ideas  Symptoms occur occasionally (half of the days of the week )  The severity of symptoms is moderate  The quality of sleep is good  Nighttime awakenings: none  Compliance with medications is %  Treatment side effects: none  The following portions of the patient's history were reviewed and updated as appropriate: allergies, current medications, past family history, past medical history, past surgical history and problem list     Review of Systems   Constitutional: Positive for irritability  HENT: Negative  Eyes: Negative  Respiratory: Negative  Negative for shortness of breath  Cardiovascular: Negative  Gastrointestinal: Negative  Endocrine: Negative  Genitourinary: Negative  Negative for impotence  Musculoskeletal: Negative  Skin: Negative  Allergic/Immunologic: Negative  Neurological: Negative  Negative for dizziness  Hematological: Negative  Psychiatric/Behavioral: Positive for decreased concentration  Negative for suicidal ideas   The patient is nervous/anxious  The patient does not have insomnia  Objective     Physical Exam   Constitutional: He is oriented to person, place, and time  He appears well-developed and well-nourished  No distress  HENT:   Head: Normocephalic and atraumatic  Eyes: Conjunctivae are normal  Right eye exhibits no discharge  Left eye exhibits no discharge  Neck: Normal range of motion  Neck supple  Cardiovascular: Normal rate and regular rhythm  Pulmonary/Chest: Effort normal and breath sounds normal    Abdominal: Soft  Bowel sounds are normal    Musculoskeletal: Normal range of motion  He exhibits no edema or tenderness  Neurological: He is alert and oriented to person, place, and time  Skin: Skin is warm and dry  Capillary refill takes less than 2 seconds  He is not diaphoretic  Psychiatric: He has a normal mood and affect  His behavior is normal  Judgment and thought content normal    Nursing note and vitals reviewed  No Known Allergies  There is no problem list on file for this patient        Current Outpatient Medications:     escitalopram (LEXAPRO) 20 mg tablet, Take 1 tablet (20 mg total) by mouth daily, Disp: 30 tablet, Rfl: 2    LORazepam (ATIVAN) 0 5 mg tablet, Take 1 tablet (0 5 mg total) by mouth every 8 (eight) hours as needed for anxiety, Disp: 30 tablet, Rfl: 0  Social History     Socioeconomic History    Marital status: /Civil Union     Spouse name: Not on file    Number of children: 4    Years of education: Not on file    Highest education level: Not on file   Occupational History    Not on file   Social Needs    Financial resource strain: Not hard at all   Ana Maria-Clair insecurity:     Worry: Never true     Inability: Never true   [employer] needs:     Medical: No     Non-medical: No   Tobacco Use    Smoking status: Former Smoker    Smokeless tobacco: Never Used   Substance and Sexual Activity    Alcohol use: Yes     Comment: social     Drug use: Not Currently   Emaline Folds Sexual activity: Not on file   Lifestyle    Physical activity:     Days per week: 4 days     Minutes per session: Not on file    Stress:  Only a little   Relationships    Social connections:     Talks on phone: Once a week     Gets together: Once a week     Attends Restoration service: Never     Active member of club or organization: No     Attends meetings of clubs or organizations: Never     Relationship status:     Intimate partner violence:     Fear of current or ex partner: No     Emotionally abused: No     Physically abused: No     Forced sexual activity: No   Other Topics Concern    Not on file   Social History Narrative    Not on file     Family History   Problem Relation Age of Onset    Bipolar disorder Brother     Heart disease Maternal Grandmother     Hypertension Maternal Grandmother     Hypertension Maternal Grandfather     Heart disease Maternal Grandfather     Cancer Maternal Grandfather     Hypertension Paternal Grandmother     Heart disease Paternal Grandmother

## 2020-01-19 DIAGNOSIS — F41.9 ANXIETY: ICD-10-CM

## 2020-01-20 RX ORDER — ESCITALOPRAM OXALATE 10 MG/1
TABLET ORAL
Qty: 30 TABLET | Refills: 0 | OUTPATIENT
Start: 2020-01-20

## 2020-02-07 ENCOUNTER — OFFICE VISIT (OUTPATIENT)
Dept: FAMILY MEDICINE CLINIC | Facility: CLINIC | Age: 35
End: 2020-02-07
Payer: COMMERCIAL

## 2020-02-07 VITALS
BODY MASS INDEX: 29.92 KG/M2 | SYSTOLIC BLOOD PRESSURE: 110 MMHG | DIASTOLIC BLOOD PRESSURE: 78 MMHG | HEART RATE: 59 BPM | OXYGEN SATURATION: 98 % | HEIGHT: 69 IN | WEIGHT: 202 LBS | TEMPERATURE: 97.3 F

## 2020-02-07 DIAGNOSIS — F41.9 ANXIETY: Primary | ICD-10-CM

## 2020-02-07 PROCEDURE — 3008F BODY MASS INDEX DOCD: CPT | Performed by: NURSE PRACTITIONER

## 2020-02-07 PROCEDURE — 1036F TOBACCO NON-USER: CPT | Performed by: NURSE PRACTITIONER

## 2020-02-07 PROCEDURE — 99214 OFFICE O/P EST MOD 30 MIN: CPT | Performed by: NURSE PRACTITIONER

## 2020-02-07 NOTE — PROGRESS NOTES
Assessment/Plan   Problem List Items Addressed This Visit     None      Visit Diagnoses     Anxiety    -  Primary    Relevant Medications    sertraline (ZOLOFT) 50 mg tablet    Other Relevant Orders    Ambulatory referral to Psychiatry                Chief Complaint   Patient presents with    Follow-up     Follow up Anxiety       Subjective   Patient ID: Roberta Méndez is a 36 y o  male  Vitals:    02/07/20 1042   BP: 110/78   Pulse: 59   Temp: (!) 97 3 °F (36 3 °C)   SpO2: 98%     Here today with spouse and 2 of their 4 children  She reports that since the lexapro has been increased to 20 mg Po daily, he is "more down and depressed", She reports "like a Zombie", "sits on the couch staring at the phone or watching mindless TV"  Stephanie Mckeon reports that when he is at work all day he is "focused" and "has a routine", "on top of my game", but when he returns home he is tired and has no energy  "The demands of the family are too much after working all day  "  There is obvious tension in the room  Encouraged the two to seek family counseloring since 1 of the 4 children does have special needs  Stephanie Mckeon reports that he has done better on Zoloft in the past but did not think he had a high enough does was on 50 mg  Will stop lexapro and change to Zoloft  Follow up in 3 weeks  He has been advised to stop calling [employer] who are not returning his calls and go to walk in intake      The following portions of the patient's history were reviewed and updated as appropriate: allergies, current medications, past medical history, past social history, past surgical history and problem list     Review of Systems   Constitutional: Positive for activity change and fatigue  HENT: Negative  Eyes: Negative  Respiratory: Negative  Cardiovascular: Negative  Gastrointestinal: Negative  Endocrine: Negative  Genitourinary: Negative  Musculoskeletal: Negative  Allergic/Immunologic: Negative  Neurological: Negative  Hematological: Negative  Psychiatric/Behavioral: Positive for decreased concentration and sleep disturbance  Negative for suicidal ideas  The patient is nervous/anxious  Objective     Physical Exam   Constitutional: He is oriented to person, place, and time  He appears well-developed and well-nourished  No distress  HENT:   Head: Normocephalic and atraumatic  Eyes: Conjunctivae are normal  Right eye exhibits no discharge  Left eye exhibits no discharge  Neck: Normal range of motion  Neck supple  Cardiovascular: Normal rate and regular rhythm  Pulmonary/Chest: Effort normal and breath sounds normal    Musculoskeletal: Normal range of motion  He exhibits no edema or tenderness  Neurological: He is alert and oriented to person, place, and time  Skin: Skin is warm and dry  Capillary refill takes less than 2 seconds  He is not diaphoretic  Psychiatric: He has a normal mood and affect  His behavior is normal  Judgment and thought content normal    Nursing note and vitals reviewed

## 2020-02-07 NOTE — PATIENT INSTRUCTIONS
1  Stop Lexapro begin Zoloft take 1/2 tab x'3, then I tablet at 50 mg   2  Schedule with Psychiatry  3  Walk in to [facility] for intake   4   Follow up 3-4 weeks

## 2020-02-13 ENCOUNTER — TELEPHONE (OUTPATIENT)
Dept: FAMILY MEDICINE CLINIC | Facility: CLINIC | Age: 35
End: 2020-02-13

## 2020-02-13 NOTE — PROGRESS NOTES
Patient called to say that the 50 mg of Zoloft  That he has been taking for 1 week - yesterday felt increased anger and anxiety, needed to use an ativan last evening to calm down, wonders if the dose should be increased  Will try 75 mg PO daily over next few days and he will monitor the effect of this dose and call in with results  He does have an appointment with [facility] next week   Will see what they may recommend

## 2020-02-28 ENCOUNTER — OFFICE VISIT (OUTPATIENT)
Dept: FAMILY MEDICINE CLINIC | Facility: CLINIC | Age: 35
End: 2020-02-28
Payer: COMMERCIAL

## 2020-02-28 VITALS
SYSTOLIC BLOOD PRESSURE: 112 MMHG | BODY MASS INDEX: 30.24 KG/M2 | WEIGHT: 204.2 LBS | HEIGHT: 69 IN | RESPIRATION RATE: 15 BRPM | TEMPERATURE: 97.3 F | HEART RATE: 60 BPM | OXYGEN SATURATION: 97 % | DIASTOLIC BLOOD PRESSURE: 78 MMHG

## 2020-02-28 DIAGNOSIS — F41.9 ANXIETY: Primary | ICD-10-CM

## 2020-02-28 PROCEDURE — 1036F TOBACCO NON-USER: CPT | Performed by: NURSE PRACTITIONER

## 2020-02-28 PROCEDURE — 3008F BODY MASS INDEX DOCD: CPT | Performed by: NURSE PRACTITIONER

## 2020-02-28 PROCEDURE — 99213 OFFICE O/P EST LOW 20 MIN: CPT | Performed by: NURSE PRACTITIONER

## 2020-02-28 RX ORDER — SERTRALINE HYDROCHLORIDE 100 MG/1
100 TABLET, FILM COATED ORAL DAILY
Qty: 30 TABLET | Refills: 2 | Status: SHIPPED | OUTPATIENT
Start: 2020-02-28 | End: 2021-03-16

## 2020-02-28 NOTE — PROGRESS NOTES
Assessment/Plan   Problem List Items Addressed This Visit     None      Visit Diagnoses     Anxiety    -  Primary    Relevant Medications    sertraline (ZOLOFT) 100 mg tablet                Chief Complaint   Patient presents with    Follow-up     for anxiety        Subjective   Patient ID: Yudi Rodas is a 36 y o  male  Vitals:    02/28/20 1304   BP: 112/78   Pulse: 60   Resp: 15   Temp: (!) 97 3 °F (36 3 °C)   SpO2: 97%     PTSD with mild depression, and ADHD - Intake at [facility], has appointment again 3/13/2020 with psychologist and one the following week, he will then be able to see psychiatry  Anxiety   Presents for follow-up visit  Symptoms include decreased concentration and nervous/anxious behavior  Patient reports no chest pain, depressed mood, dizziness, dry mouth, excessive worry, insomnia, palpitations, shortness of breath or suicidal ideas  Symptoms occur occasionally  The severity of symptoms is moderate  The quality of sleep is fair  Nighttime awakenings: occasional      Compliance with medications is %  Treatment side effects: none  The following portions of the patient's history were reviewed and updated as appropriate: allergies, current medications, past family history, past medical history, past surgical history and problem list     Review of Systems   Constitutional: Negative  HENT: Negative  Eyes: Negative  Respiratory: Negative  Negative for shortness of breath  Cardiovascular: Negative  Negative for chest pain and palpitations  Gastrointestinal: Negative  Endocrine: Negative  Genitourinary: Negative  Musculoskeletal: Negative  Skin: Negative  Allergic/Immunologic: Negative  Neurological: Negative  Negative for dizziness  Hematological: Negative  Psychiatric/Behavioral: Positive for decreased concentration  Negative for suicidal ideas  The patient is nervous/anxious  The patient does not have insomnia          Objective Physical Exam   Constitutional: He is oriented to person, place, and time  He appears well-developed and well-nourished  No distress  HENT:   Head: Normocephalic and atraumatic  Eyes: Conjunctivae are normal  Right eye exhibits no discharge  Left eye exhibits no discharge  Neck: Normal range of motion  Neck supple  Cardiovascular: Normal rate, regular rhythm, normal heart sounds and intact distal pulses  No murmur heard  Pulmonary/Chest: Effort normal and breath sounds normal    Abdominal: Soft  Bowel sounds are normal    Musculoskeletal: Normal range of motion  He exhibits no edema or tenderness  Neurological: He is alert and oriented to person, place, and time  Skin: Skin is warm and dry  No rash noted  He is not diaphoretic  No erythema  No pallor  Psychiatric: He has a normal mood and affect  His behavior is normal  Judgment and thought content normal    Nursing note and vitals reviewed    No Known Allergies

## 2020-02-28 NOTE — PATIENT INSTRUCTIONS
1  Continue 100 mg Zoloft daily - do not increase on your own   2  Continue with therapist at [facility]  3   Follow up 3 months

## 2020-06-01 DIAGNOSIS — F41.9 ANXIETY: ICD-10-CM

## 2020-06-01 RX ORDER — SERTRALINE HYDROCHLORIDE 100 MG/1
TABLET, FILM COATED ORAL
Qty: 30 TABLET | Refills: 2 | OUTPATIENT
Start: 2020-06-01

## 2020-06-25 ENCOUNTER — OFFICE VISIT (OUTPATIENT)
Dept: FAMILY MEDICINE CLINIC | Facility: CLINIC | Age: 35
End: 2020-06-25
Payer: COMMERCIAL

## 2020-06-25 VITALS
HEART RATE: 59 BPM | DIASTOLIC BLOOD PRESSURE: 84 MMHG | HEIGHT: 69 IN | WEIGHT: 204.6 LBS | SYSTOLIC BLOOD PRESSURE: 120 MMHG | TEMPERATURE: 97.1 F | OXYGEN SATURATION: 98 % | BODY MASS INDEX: 30.3 KG/M2

## 2020-06-25 DIAGNOSIS — R21 RASH: Primary | ICD-10-CM

## 2020-06-25 DIAGNOSIS — R10.33 PERIUMBILICAL ABDOMINAL PAIN: ICD-10-CM

## 2020-06-25 PROCEDURE — 1036F TOBACCO NON-USER: CPT | Performed by: NURSE PRACTITIONER

## 2020-06-25 PROCEDURE — 99214 OFFICE O/P EST MOD 30 MIN: CPT | Performed by: NURSE PRACTITIONER

## 2020-06-25 PROCEDURE — 3008F BODY MASS INDEX DOCD: CPT | Performed by: NURSE PRACTITIONER

## 2020-06-25 RX ORDER — CLOTRIMAZOLE 1 %
CREAM (GRAM) TOPICAL 2 TIMES DAILY
Qty: 60 G | Refills: 2 | Status: SHIPPED | OUTPATIENT
Start: 2020-06-25

## 2020-07-03 ENCOUNTER — HOSPITAL ENCOUNTER (OUTPATIENT)
Dept: ULTRASOUND IMAGING | Facility: HOSPITAL | Age: 35
Discharge: HOME | End: 2020-07-03
Payer: COMMERCIAL

## 2020-07-03 DIAGNOSIS — R10.33 PERIUMBILICAL ABDOMINAL PAIN: ICD-10-CM

## 2020-07-03 PROCEDURE — 76705 ECHO EXAM OF ABDOMEN: CPT

## 2020-07-07 ENCOUNTER — TELEPHONE (OUTPATIENT)
Dept: FAMILY MEDICINE CLINIC | Facility: CLINIC | Age: 35
End: 2020-07-07

## 2020-07-08 NOTE — TELEPHONE ENCOUNTER
Please let patient know U/S ordered by Letha Carlisle is not yet read by radiology  We will contact him as soon as the report is available

## 2020-07-13 ENCOUNTER — OFFICE VISIT (OUTPATIENT)
Dept: SURGERY | Facility: HOSPITAL | Age: 35
End: 2020-07-13
Payer: COMMERCIAL

## 2020-07-13 VITALS
DIASTOLIC BLOOD PRESSURE: 85 MMHG | HEART RATE: 70 BPM | WEIGHT: 207 LBS | HEIGHT: 69 IN | BODY MASS INDEX: 30.66 KG/M2 | RESPIRATION RATE: 16 BRPM | SYSTOLIC BLOOD PRESSURE: 129 MMHG | TEMPERATURE: 98.5 F

## 2020-07-13 DIAGNOSIS — K42.9 UMBILICAL HERNIA WITHOUT OBSTRUCTION OR GANGRENE: Primary | ICD-10-CM

## 2020-07-13 PROCEDURE — 99243 OFF/OP CNSLTJ NEW/EST LOW 30: CPT | Performed by: SURGERY

## 2020-07-17 DIAGNOSIS — K42.9 UMBILICAL HERNIA WITHOUT OBSTRUCTION OR GANGRENE: ICD-10-CM

## 2020-07-17 PROCEDURE — U0003 INFECTIOUS AGENT DETECTION BY NUCLEIC ACID (DNA OR RNA); SEVERE ACUTE RESPIRATORY SYNDROME CORONAVIRUS 2 (SARS-COV-2) (CORONAVIRUS DISEASE [COVID-19]), AMPLIFIED PROBE TECHNIQUE, MAKING USE OF HIGH THROUGHPUT TECHNOLOGIES AS DESCRIBED BY CMS-2020-01-R: HCPCS

## 2020-07-20 ENCOUNTER — APPOINTMENT (OUTPATIENT)
Dept: PREADMISSION TESTING | Facility: HOSPITAL | Age: 35
End: 2020-07-20
Payer: COMMERCIAL

## 2020-07-20 LAB
INPATIENT: NORMAL
SARS-COV-2 RNA SPEC QL NAA+PROBE: NOT DETECTED

## 2020-07-21 DIAGNOSIS — K42.9 UMBILICAL HERNIA WITHOUT OBSTRUCTION OR GANGRENE: ICD-10-CM

## 2020-07-21 DIAGNOSIS — K42.9 UMBILICAL HERNIA WITHOUT OBSTRUCTION OR GANGRENE: Primary | ICD-10-CM

## 2020-07-21 PROCEDURE — U0003 INFECTIOUS AGENT DETECTION BY NUCLEIC ACID (DNA OR RNA); SEVERE ACUTE RESPIRATORY SYNDROME CORONAVIRUS 2 (SARS-COV-2) (CORONAVIRUS DISEASE [COVID-19]), AMPLIFIED PROBE TECHNIQUE, MAKING USE OF HIGH THROUGHPUT TECHNOLOGIES AS DESCRIBED BY CMS-2020-01-R: HCPCS

## 2020-07-21 RX ORDER — ACETAMINOPHEN 325 MG/1
650 TABLET ORAL EVERY 6 HOURS PRN
COMMUNITY

## 2020-07-21 NOTE — PRE-PROCEDURE INSTRUCTIONS
Pre-Surgery Instructions:   Medication Instructions    acetaminophen (TYLENOL) 325 mg tablet Instructed patient per Anesthesia Guidelines   clotrimazole (LOTRIMIN) 1 % cream Instructed patient per Anesthesia Guidelines   LORazepam (ATIVAN) 0 5 mg tablet Patient was instructed by Physician and understands   sertraline (ZOLOFT) 100 mg tablet Patient was instructed by Physician and understands  Before your operation, you play an important role in decreasing your risk for infection by washing with special antiseptic soap  This is an effective way to reduce bacteria on the skin which may help to prevent infections at the surgical site  Please read the following directions in advance  1  In the week before your operation purchase a 4 ounce bottle of antiseptic soap containing chlorhexidine gluconate 4%  Some brand names include: Aplicare, Endure, and Hibiclens  The cost is usually less than $5 00  · For your convenience, the [address] [place] [address] carries the soap  · It may also be available at your doctor's office or pre-admission testing center, and at most retail pharmacies  · If you are allergic or sensitive to soaps containing chlorhexidine gluconate (CHG), please let your doctor know so another antiseptic soap can be suggested  · CHG antiseptic soap is for external use only  2      The day before your operation follow these directions carefully to get ready  · Place clean lines (sheets) on your bed; you should sleep on clean sheets after your evening shower  · Get clean towels and washcloths ready - you need enough for 2 showers  · Set aside clean underwear, pajamas, and clothing to wear after the shower  Reminders:  · DO NOT use any other soap or body rinse on your skin during or after the antiseptic showers  · DO NOT use lotion , powder, deodorant, or perfume/aftershave of any kind on your skin after your antiseptic shower    · DO NOT shave any body parts in the 24 hours/the day before your operation  · DO NOT get the antiseptic soap in your eyes, ears, nose, mouth, or vaginal area  3      You will need to shower the night before AND the morning of your Surgery  Shower 1:  · The evening before your operation, take the fist shower  · First, shampoo your hair with regular shampoo and rinse it completely before you use the anitseptic soap  After washing and rinsing your hair, rinse your body  · Next, use a clean wash cloth to apply the antiseptic soap and wash your body from the neck down to your toes using 1/2 bottle of the antiseptic soap  You will use the other 1/2 bottle for the second shower  · Clean the area where your incision will be; later this area well for about 2 minutes  · If you ar having head or neck surgery, wash areas with the antiseptic soap  · Rinse yourself completely with running water  · Use a clean towel to dry off  · Wear clean underwear and clothing/pajamas  Shower 2:  · The Morning of your operation, take the second shower following the same steps as Shower 1 using the second 1/2 of the bottle of antiseptic soap  · Use clean cloths and towels to was and dry yourself off  · Wear clean underwear and clothing

## 2020-07-22 LAB — SARS-COV-2 RNA SPEC QL NAA+PROBE: NOT DETECTED

## 2020-07-28 ENCOUNTER — ANESTHESIA (OUTPATIENT)
Dept: PERIOP | Facility: HOSPITAL | Age: 35
End: 2020-07-28
Payer: COMMERCIAL

## 2020-07-28 ENCOUNTER — HOSPITAL ENCOUNTER (OUTPATIENT)
Facility: HOSPITAL | Age: 35
Setting detail: OUTPATIENT SURGERY
Discharge: HOME | End: 2020-07-28
Attending: SURGERY | Admitting: SURGERY
Payer: COMMERCIAL

## 2020-07-28 ENCOUNTER — TELEPHONE (OUTPATIENT)
Dept: OTHER | Facility: OTHER | Age: 35
End: 2020-07-28

## 2020-07-28 ENCOUNTER — ANESTHESIA EVENT (OUTPATIENT)
Dept: PERIOP | Facility: HOSPITAL | Age: 35
End: 2020-07-28
Payer: COMMERCIAL

## 2020-07-28 VITALS
SYSTOLIC BLOOD PRESSURE: 98 MMHG | HEIGHT: 68 IN | DIASTOLIC BLOOD PRESSURE: 57 MMHG | RESPIRATION RATE: 18 BRPM | OXYGEN SATURATION: 97 % | BODY MASS INDEX: 30.96 KG/M2 | HEART RATE: 60 BPM | WEIGHT: 204.25 LBS | TEMPERATURE: 98.3 F

## 2020-07-28 DIAGNOSIS — Z87.19 S/P HERNIA REPAIR: Primary | ICD-10-CM

## 2020-07-28 DIAGNOSIS — Z98.890 S/P HERNIA REPAIR: Primary | ICD-10-CM

## 2020-07-28 PROBLEM — K42.9 UMBILICAL HERNIA WITHOUT OBSTRUCTION OR GANGRENE: Status: RESOLVED | Noted: 2020-07-28 | Resolved: 2020-07-28

## 2020-07-28 PROBLEM — K42.9 UMBILICAL HERNIA WITHOUT OBSTRUCTION OR GANGRENE: Status: ACTIVE | Noted: 2020-07-28

## 2020-07-28 PROCEDURE — C1781 MESH (IMPLANTABLE): HCPCS | Performed by: SURGERY

## 2020-07-28 PROCEDURE — 49650 LAP ING HERNIA REPAIR INIT: CPT | Performed by: SURGERY

## 2020-07-28 PROCEDURE — 49650 LAP ING HERNIA REPAIR INIT: CPT | Performed by: PHYSICIAN ASSISTANT

## 2020-07-28 DEVICE — SORBAFIX ABSORBABLE FIXATION SYSTEM 30 ABSORBABLE FASTENERS
Type: IMPLANTABLE DEVICE | Site: ABDOMEN | Status: FUNCTIONAL
Brand: SORBAFIX ABSORBABLE FIXATION SYSTEM

## 2020-07-28 DEVICE — BARD 3DMAX MESH LEFT LARGE
Type: IMPLANTABLE DEVICE | Site: ABDOMEN | Status: FUNCTIONAL
Brand: BARD 3DMAX MESH

## 2020-07-28 DEVICE — BARD 3DMAX MESH RIGHT LARGE
Type: IMPLANTABLE DEVICE | Site: ABDOMEN | Status: FUNCTIONAL
Brand: BARD 3DMAX MESH

## 2020-07-28 RX ORDER — PROPOFOL 10 MG/ML
INJECTION, EMULSION INTRAVENOUS AS NEEDED
Status: DISCONTINUED | OUTPATIENT
Start: 2020-07-28 | End: 2020-07-28 | Stop reason: SURG

## 2020-07-28 RX ORDER — SODIUM CHLORIDE, SODIUM LACTATE, POTASSIUM CHLORIDE, CALCIUM CHLORIDE 600; 310; 30; 20 MG/100ML; MG/100ML; MG/100ML; MG/100ML
125 INJECTION, SOLUTION INTRAVENOUS CONTINUOUS
Status: CANCELLED | OUTPATIENT
Start: 2020-07-28

## 2020-07-28 RX ORDER — MIDAZOLAM HYDROCHLORIDE 2 MG/2ML
INJECTION, SOLUTION INTRAMUSCULAR; INTRAVENOUS AS NEEDED
Status: DISCONTINUED | OUTPATIENT
Start: 2020-07-28 | End: 2020-07-28 | Stop reason: SURG

## 2020-07-28 RX ORDER — FENTANYL CITRATE/PF 50 MCG/ML
25 SYRINGE (ML) INJECTION
Status: DISCONTINUED | OUTPATIENT
Start: 2020-07-28 | End: 2020-07-28 | Stop reason: HOSPADM

## 2020-07-28 RX ORDER — HYDROCODONE BITARTRATE AND ACETAMINOPHEN 5; 325 MG/1; MG/1
2 TABLET ORAL EVERY 4 HOURS PRN
Status: DISCONTINUED | OUTPATIENT
Start: 2020-07-28 | End: 2020-07-28 | Stop reason: HOSPADM

## 2020-07-28 RX ORDER — GLYCOPYRROLATE 0.2 MG/ML
INJECTION INTRAMUSCULAR; INTRAVENOUS AS NEEDED
Status: DISCONTINUED | OUTPATIENT
Start: 2020-07-28 | End: 2020-07-28 | Stop reason: SURG

## 2020-07-28 RX ORDER — HYDROCODONE BITARTRATE AND ACETAMINOPHEN 5; 325 MG/1; MG/1
1-2 TABLET ORAL EVERY 6 HOURS PRN
Qty: 24 TABLET | Refills: 0 | Status: SHIPPED | OUTPATIENT
Start: 2020-07-28 | End: 2020-07-31

## 2020-07-28 RX ORDER — HYDROMORPHONE HCL/PF 1 MG/ML
0.5 SYRINGE (ML) INJECTION
Status: DISCONTINUED | OUTPATIENT
Start: 2020-07-28 | End: 2020-07-28 | Stop reason: HOSPADM

## 2020-07-28 RX ORDER — CEFAZOLIN SODIUM 2 G/50ML
2000 SOLUTION INTRAVENOUS ONCE
Status: COMPLETED | OUTPATIENT
Start: 2020-07-28 | End: 2020-07-28

## 2020-07-28 RX ORDER — IBUPROFEN 600 MG/1
600 TABLET ORAL EVERY 6 HOURS PRN
Qty: 20 TABLET | Refills: 0 | Status: SHIPPED | OUTPATIENT
Start: 2020-07-28

## 2020-07-28 RX ORDER — ONDANSETRON 2 MG/ML
INJECTION INTRAMUSCULAR; INTRAVENOUS AS NEEDED
Status: DISCONTINUED | OUTPATIENT
Start: 2020-07-28 | End: 2020-07-28 | Stop reason: SURG

## 2020-07-28 RX ORDER — KETOROLAC TROMETHAMINE 30 MG/ML
INJECTION, SOLUTION INTRAMUSCULAR; INTRAVENOUS AS NEEDED
Status: DISCONTINUED | OUTPATIENT
Start: 2020-07-28 | End: 2020-07-28 | Stop reason: SURG

## 2020-07-28 RX ORDER — NEOSTIGMINE METHYLSULFATE 1 MG/ML
INJECTION INTRAVENOUS AS NEEDED
Status: DISCONTINUED | OUTPATIENT
Start: 2020-07-28 | End: 2020-07-28 | Stop reason: SURG

## 2020-07-28 RX ORDER — CEFAZOLIN SODIUM 2 G/50ML
2000 SOLUTION INTRAVENOUS ONCE
Status: CANCELLED | OUTPATIENT
Start: 2020-07-28 | End: 2020-07-28

## 2020-07-28 RX ORDER — DEXAMETHASONE SODIUM PHOSPHATE 10 MG/ML
INJECTION, SOLUTION INTRAMUSCULAR; INTRAVENOUS AS NEEDED
Status: DISCONTINUED | OUTPATIENT
Start: 2020-07-28 | End: 2020-07-28 | Stop reason: SURG

## 2020-07-28 RX ORDER — FENTANYL CITRATE 50 UG/ML
INJECTION, SOLUTION INTRAMUSCULAR; INTRAVENOUS AS NEEDED
Status: DISCONTINUED | OUTPATIENT
Start: 2020-07-28 | End: 2020-07-28 | Stop reason: SURG

## 2020-07-28 RX ORDER — ONDANSETRON 2 MG/ML
4 INJECTION INTRAMUSCULAR; INTRAVENOUS EVERY 6 HOURS PRN
Status: DISCONTINUED | OUTPATIENT
Start: 2020-07-28 | End: 2020-07-28 | Stop reason: HOSPADM

## 2020-07-28 RX ORDER — TAMSULOSIN HYDROCHLORIDE 0.4 MG/1
0.4 CAPSULE ORAL ONCE AS NEEDED
Status: CANCELLED | OUTPATIENT
Start: 2020-07-28

## 2020-07-28 RX ORDER — SODIUM CHLORIDE, SODIUM LACTATE, POTASSIUM CHLORIDE, CALCIUM CHLORIDE 600; 310; 30; 20 MG/100ML; MG/100ML; MG/100ML; MG/100ML
75 INJECTION, SOLUTION INTRAVENOUS CONTINUOUS
Status: DISCONTINUED | OUTPATIENT
Start: 2020-07-28 | End: 2020-07-28 | Stop reason: HOSPADM

## 2020-07-28 RX ORDER — ACETAMINOPHEN 325 MG/1
650 TABLET ORAL EVERY 6 HOURS PRN
Status: DISCONTINUED | OUTPATIENT
Start: 2020-07-28 | End: 2020-07-28 | Stop reason: HOSPADM

## 2020-07-28 RX ORDER — ROCURONIUM BROMIDE 10 MG/ML
INJECTION, SOLUTION INTRAVENOUS AS NEEDED
Status: DISCONTINUED | OUTPATIENT
Start: 2020-07-28 | End: 2020-07-28 | Stop reason: SURG

## 2020-07-28 RX ADMIN — FENTANYL CITRATE 25 MCG: 50 INJECTION, SOLUTION INTRAMUSCULAR; INTRAVENOUS at 10:57

## 2020-07-28 RX ADMIN — CEFAZOLIN SODIUM 2000 MG: 2 SOLUTION INTRAVENOUS at 09:12

## 2020-07-28 RX ADMIN — DEXAMETHASONE SODIUM PHOSPHATE 4 MG: 10 INJECTION, SOLUTION INTRAMUSCULAR; INTRAVENOUS at 09:20

## 2020-07-28 RX ADMIN — HYDROCODONE BITARTRATE AND ACETAMINOPHEN 2 TABLET: 5; 325 TABLET ORAL at 12:02

## 2020-07-28 RX ADMIN — ONDANSETRON 4 MG: 2 INJECTION INTRAMUSCULAR; INTRAVENOUS at 09:20

## 2020-07-28 RX ADMIN — MIDAZOLAM 2 MG: 1 INJECTION INTRAMUSCULAR; INTRAVENOUS at 08:56

## 2020-07-28 RX ADMIN — SODIUM CHLORIDE, SODIUM LACTATE, POTASSIUM CHLORIDE, AND CALCIUM CHLORIDE 75 ML/HR: .6; .31; .03; .02 INJECTION, SOLUTION INTRAVENOUS at 08:15

## 2020-07-28 RX ADMIN — GLYCOPYRROLATE 0.6 MG: 0.2 INJECTION, SOLUTION INTRAMUSCULAR; INTRAVENOUS at 10:19

## 2020-07-28 RX ADMIN — KETOROLAC TROMETHAMINE 15 MG: 30 INJECTION, SOLUTION INTRAMUSCULAR; INTRAVENOUS at 09:55

## 2020-07-28 RX ADMIN — FENTANYL CITRATE 50 MCG: 50 INJECTION, SOLUTION INTRAMUSCULAR; INTRAVENOUS at 09:13

## 2020-07-28 RX ADMIN — PROPOFOL 300 MG: 10 INJECTION, EMULSION INTRAVENOUS at 08:59

## 2020-07-28 RX ADMIN — NEOSTIGMINE METHYLSULFATE 4 MG: 1 INJECTION, SOLUTION INTRAVENOUS at 10:19

## 2020-07-28 RX ADMIN — FENTANYL CITRATE 100 MCG: 50 INJECTION, SOLUTION INTRAMUSCULAR; INTRAVENOUS at 08:58

## 2020-07-28 RX ADMIN — ROCURONIUM BROMIDE 50 MG: 10 INJECTION, SOLUTION INTRAVENOUS at 08:59

## 2020-07-28 RX ADMIN — SODIUM CHLORIDE, SODIUM LACTATE, POTASSIUM CHLORIDE, AND CALCIUM CHLORIDE: .6; .31; .03; .02 INJECTION, SOLUTION INTRAVENOUS at 10:19

## 2020-07-28 RX ADMIN — FENTANYL CITRATE 50 MCG: 50 INJECTION, SOLUTION INTRAMUSCULAR; INTRAVENOUS at 10:00

## 2020-07-28 NOTE — PROGRESS NOTES
Assessment/Plan:   Joey Stout is a 30 y o male who is here for Hernia (New patient consult for umbilical hernia  Does have some tenderness to area)    Plan: Umbilical hernia possible left inguinal hernia - discussed conservative vs operative management, operative approaches, risks, and benefits and patient has decided to proceed with open umbilical hernia and I will perform diagnostic laparoscopy to evaluate for an inguinal hernia and should I find one I will repair any that I may encounter    Preoperative Clearance: None    HPI:  Joey Stout is a 30 y o male who was referred for evaluation of Hernia (New patient consult for umbilical hernia  Does have some tenderness to area)      Currently having some discomfort at umbilicus that is worse with activity and better with rest, some discomfort in left groin occasionally, no nausea vomiting, or changes in bowel habits     ROS:  General ROS: negative  negative for - chills, fatigue, fever or night sweats, weight loss  Respiratory ROS: no cough, shortness of breath, or wheezing  Cardiovascular ROS: no chest pain or dyspnea on exertion  Genito-Urinary ROS: no dysuria, trouble voiding, or hematuria  Musculoskeletal ROS: negative for - gait disturbance, joint pain or muscle pain  Neurological ROS: no TIA or stroke symptoms  GI: umbilical lump and pain    [unfilled]  Patient has no known allergies  No current facility-administered medications for this visit  No current outpatient medications on file      Facility-Administered Medications Ordered in Other Visits:     ceFAZolin (ANCEF) IVPB (premix) 2,000 mg 50 mL, 2,000 mg, Intravenous, Once, Francisco Mike PA-C    lactated ringers infusion, 75 mL/hr, Intravenous, Continuous, Galina Conner MD, Last Rate: 75 mL/hr at 07/28/20 0815, 75 mL/hr at 07/28/20 0815  Past Medical History:   Diagnosis Date    Spina bifida [facility])      Past Surgical History:   Procedure Laterality Date   [facility] DENTAL SURGERY      VASECTOMY  2018     Family History   Problem Relation Age of Onset    Bipolar disorder Brother     Heart disease Maternal Grandmother     Hypertension Maternal Grandmother     Hypertension Maternal Grandfather     Heart disease Maternal Grandfather     Cancer Maternal Grandfather     Hypertension Paternal Grandmother     Heart disease Paternal Grandmother       reports that he quit smoking about 7 years ago  He has a 12 00 pack-year smoking history  He has never used smokeless tobacco  He reports that he drinks alcohol  He reports that he has current or past drug history  Drug: Marijuana  Labs:   Lab Results   Component Value Date    WBC 4 9 11/08/2019    HGB 15 1 11/08/2019     11/08/2019     Lab Results   Component Value Date    ALT 55 (H) 11/08/2019    AST 34 11/08/2019     This SmartLink has not been configured with any valid records  PHYSICAL EXAM  General Appearance:    Alert, cooperative, no distress,   Head:    Normocephalic without obvious abnormality   Eyes:    PERRL, conjunctiva/corneas clear, EOM's intact        Neck:   Supple, no adenopathy, no JVD   Back:     Symmetric, no spinal or CVA tenderness   Lungs:     Clear to auscultation bilaterally, no wheezing or rhonchi   Heart:    Regular rate and rhythm, S1 and S2 normal, no murmur   Abdomen:     Soft +BS ND + umbilical hernia reducible, ? LIH   Extremities:   Extremities normal  No clubbing, cyanosis or edema   Psych:   Normal Affect, AOx3  Neurologic:  Skin:   CNII-XII intact  Strength symmetric, speech intact    Warm, dry, intact, no visible rashes or lesions         Physical Exam              Some portions of this record may have been generated with voice recognition software  There may be translation, syntax,  or grammatical errors  Occasional wrong word or "sound-a-like" substitutions may have occurred due to the inherent limitations of the voice recognition software   Read the chart carefully and recognize, using context, where substitutions may have occurred  If you have any questions, please contact the dictating provider for clarification or correction, as needed  This encounter has been coded by a non-certified coder

## 2020-07-28 NOTE — INTERVAL H&P NOTE
H&P reviewed  After examining the patient I find no changes in the patients condition since the H&P had been written      Vitals:    07/28/20 0758   BP: 122/87   Pulse: 61   Resp: 16   Temp: 98 3 °F (36 8 °C)   SpO2: 96%

## 2020-07-28 NOTE — H&P (VIEW-ONLY)
Assessment/Plan:   Renate Sun is a 30 y o male who is here for Hernia (New patient consult for umbilical hernia  Does have some tenderness to area)    Plan: Umbilical hernia possible left inguinal hernia - discussed conservative vs operative management, operative approaches, risks, and benefits and patient has decided to proceed with open umbilical hernia and I will perform diagnostic laparoscopy to evaluate for an inguinal hernia and should I find one I will repair any that I may encounter    Preoperative Clearance: None    HPI:  Renate Sun is a 30 y o male who was referred for evaluation of Hernia (New patient consult for umbilical hernia  Does have some tenderness to area)      Currently having some discomfort at umbilicus that is worse with activity and better with rest, some discomfort in left groin occasionally, no nausea vomiting, or changes in bowel habits     ROS:  General ROS: negative  negative for - chills, fatigue, fever or night sweats, weight loss  Respiratory ROS: no cough, shortness of breath, or wheezing  Cardiovascular ROS: no chest pain or dyspnea on exertion  Genito-Urinary ROS: no dysuria, trouble voiding, or hematuria  Musculoskeletal ROS: negative for - gait disturbance, joint pain or muscle pain  Neurological ROS: no TIA or stroke symptoms  GI: umbilical lump and pain    [unfilled]  Patient has no known allergies  No current facility-administered medications for this visit  No current outpatient medications on file      Facility-Administered Medications Ordered in Other Visits:     ceFAZolin (ANCEF) IVPB (premix) 2,000 mg 50 mL, 2,000 mg, Intravenous, Once, Micki Mike PA-C    lactated ringers infusion, 75 mL/hr, Intravenous, Continuous, Rosalind Scott MD, Last Rate: 75 mL/hr at 07/28/20 0815, 75 mL/hr at 07/28/20 0815  Past Medical History:   Diagnosis Date    Spina bifida [facility])      Past Surgical History:   Procedure Laterality Date   [facility] DENTAL SURGERY      VASECTOMY  2018     Family History   Problem Relation Age of Onset    Bipolar disorder Brother     Heart disease Maternal Grandmother     Hypertension Maternal Grandmother     Hypertension Maternal Grandfather     Heart disease Maternal Grandfather     Cancer Maternal Grandfather     Hypertension Paternal Grandmother     Heart disease Paternal Grandmother       reports that he quit smoking about 7 years ago  He has a 12 00 pack-year smoking history  He has never used smokeless tobacco  He reports that he drinks alcohol  He reports that he has current or past drug history  Drug: Marijuana  Labs:   Lab Results   Component Value Date    WBC 4 9 11/08/2019    HGB 15 1 11/08/2019     11/08/2019     Lab Results   Component Value Date    ALT 55 (H) 11/08/2019    AST 34 11/08/2019     This SmartLink has not been configured with any valid records  PHYSICAL EXAM  General Appearance:    Alert, cooperative, no distress,   Head:    Normocephalic without obvious abnormality   Eyes:    PERRL, conjunctiva/corneas clear, EOM's intact        Neck:   Supple, no adenopathy, no JVD   Back:     Symmetric, no spinal or CVA tenderness   Lungs:     Clear to auscultation bilaterally, no wheezing or rhonchi   Heart:    Regular rate and rhythm, S1 and S2 normal, no murmur   Abdomen:     Soft +BS ND + umbilical hernia reducible, ? LIH   Extremities:   Extremities normal  No clubbing, cyanosis or edema   Psych:   Normal Affect, AOx3  Neurologic:  Skin:   CNII-XII intact  Strength symmetric, speech intact    Warm, dry, intact, no visible rashes or lesions         Physical Exam              Some portions of this record may have been generated with voice recognition software  There may be translation, syntax,  or grammatical errors  Occasional wrong word or "sound-a-like" substitutions may have occurred due to the inherent limitations of the voice recognition software   Read the chart carefully and recognize, using context, where substitutions may have occurred  If you have any questions, please contact the dictating provider for clarification or correction, as needed  This encounter has been coded by a non-certified coder

## 2020-07-28 NOTE — INTERIM OP NOTE
REPAIR HERNIA UMBILICAL, REPAIR HERNIA INGUINAL, LAPAROSCOPIC  Postoperative Note  PATIENT NAME: Toya Mccarthy  : 1985  MRN: [id number]  UB OR ROOM 02    Surgery Date: 2020    Preop Diagnosis:  Umbilical hernia without obstruction or gangrene [K42 9]    Post-Op Diagnosis Codes:     * Umbilical hernia without obstruction or gangrene [K42 9]  Non recurrent bilateral inguinal hernias without gangrene or obstruction  Procedure(s) (LRB):  REPAIR HERNIA UMBILICAL (N/A)  REPAIR HERNIA INGUINAL, LAPAROSCOPIC (Bilateral)    Surgeon(s) and Role:     * Uriah Amos MD - Primary     * Bisi Mike PA-C - Assisting    Specimens:  * No specimens in log *    Estimated Blood Loss:   Minimal    Anesthesia Type:   General ETA    Findings:    As above  Umbilical and bilateral indirect inguinal hernias  Complications:   None    Hernia Surgery Operative Note    Name: Toya Mccarthy    Gender: male    Age: 30 y o  Race:     BMI: Body mass index is 31 06 kg/m²  DIAGNOSIS:   1   S/P hernia repair  HYDROcodone-acetaminophen (NORCO) 5-325 mg per tablet    ibuprofen (MOTRIN) 600 mg tablet       Diabetes Mellitis: No    Coronary Heart Disease: No    Cancer: No    Steroid Use: No    Tobacco use: No   Last used:    Type: cigarettes   Frequency (per day): 1 ppd   Duration (years): 12    Alcohol use: Yes Moderate    Location of Hernia: left indirect inguinal  Length:1 5 cm  Width:1 5 cm  Primary: Yes  Recurrent: No   Number of recurrences:    Access: Laparoscope    Component Separation: No    Mesh:   Yes -  Type: Synthetic   Large left 3D MAX    Location of Hernia: right  indirect inguinal  Length:1 0 cm  Width:1 0 cm  Primary: Yes  Recurrent: No   Number of recurrences:    Access: Laparoscope    Component Separation: No    Mesh:   Yes -  Type: Synthetic   Large right 3D MAX    Location of Hernia: umbilical  FSCGYS:3 7 cm  Width:1 5  cm  Primary: Yes  Recurrent: No   Number of recurrences:    Access: Open    Component Separation: No    Mesh: NO      Operative Time: 54 min           SIGNATURE: Eladia Mike PA-C   DATE: July 28, 2020   TIME: 10:26 AM

## 2020-07-28 NOTE — ANESTHESIA PREPROCEDURE EVALUATION
Review of Systems/Medical History  Patient summary reviewed  Chart reviewed      Cardiovascular  Negative cardio ROS    Pulmonary  Negative pulmonary ROS        GI/Hepatic  Negative GI/hepatic ROS          Negative  ROS        Endo/Other  Negative endo/other ROS      GYN  Negative gynecology ROS          Hematology  Negative hematology ROS      Musculoskeletal  Negative musculoskeletal ROS        Neurology  Negative neurology ROS     Comment: H/o spina bifida Psychology   Negative psychology ROS Anxiety, Depression ,              Physical Exam    Airway    Mallampati score: I  TM Distance: >3 FB  Neck ROM: full     Dental   No notable dental hx     Cardiovascular  Comment: Negative ROS, Rhythm: regular, Rate: normal, Cardiovascular exam normal    Pulmonary  Pulmonary exam normal Breath sounds clear to auscultation,     Other Findings        Anesthesia Plan  ASA Score- 2     Anesthesia Type- general with ASA Monitors  Additional Monitors:   Airway Plan: ETT  Comment: Benefits and risks of planned anesthetic discussed with patient, and agrees to proceed  I, Dr Nick Mendiola, the attending anesthesiologist, have personally seen and evaluated the patient prior to anesthetic care  I have reviewed the preanesthetic record, and other medical records if appropriate to the anesthetic care  If a CRNA is involved in the case, I have reviewed the CRNA assessment, if present, and agree  The patient is in a suitable condition to proceed with my formulated anesthetic plan        Plan Factors-    Induction- intravenous  Postoperative Plan- Plan for postoperative opioid use  Informed Consent- Anesthetic plan and risks discussed with patient and spouse

## 2020-07-28 NOTE — DISCHARGE INSTRUCTIONS
Andria Torres Instructions                  Dr Flora OBANDO     1  General: Millie Tucker will feel pulling sensations around the wound or funny aches and pains around the incisions  This is normal  Even minor surgery is a change in your body and this is your bodys way of reaction to it  If you have had abdominal surgery, it may help to support the incision with a small pillow or blanket for comfort when moving or coughing  2  Wound care:     Glue - Leave glue alone, it will fall off on its own, no need for an additional dressings    3  Water: You may shower over the wounds  Do not bathe or use a pool or hot tub until cleared by the physician  You may shower right over the incisions and rinse wound with soapy water but do not scrub incisions  Pat dry when you are done  4  Activity: You may go up and down stairs, walk as much as you are comfortable, but walk at least 3 times each day  If you have had abdominal surgery, do not lift anything heavier than 15 pounds for at least 2 weeks, unless cleared by the doctor  5  Diet: You may resume a regular diet  If you had a same-day surgery or overnight stay surgery, you may wish to eat lightly for a few days: soups, crackers, and sandwiches  You may resume a regular diet when ready  6  Medications: Resume all of your previous medications, unless told otherwise by the doctor  Avoid aspirin products for 2-3 days after the date of surgery  You may, at that time, began to take them again  Tylenol and Ibuprofen are always fine, unless you are taking any narcotic pain medication containing Tylenol (such as Percocet, Darvocet, Vicodin, or anything containing acetaminophen)  Do not take Tylenol if you're taking these medications  You do not need to take the narcotic pain medications unless you are having significant pain and discomfort  7  Driving: You will need someone to drive you home on the day of surgery   Do not drive or make any important decisions while on narcotic pain medication or 24 hours and after anesthesia or sedation for surgery  Generally, you may drive when your off all narcotic pain medications  8  Upset Stomach: You may take Maalox, Tums, or similar items for an upset stomach  If your narcotic pain medication causes an upset stomach, do not take it on an empty stomach  Try taking it with at least some crackers or toast      9  Constipation: Patients often experienced constipation after surgery  You may take over-the-counter medication for this, such as Metamucil, Senokot, Dulcolax, milk of magnesia, etc  You may take a suppository unless you have had anorectal surgery such as a procedure on your hemorrhoids  If you experience significant nausea or vomiting after abdominal surgery, call the office before trying any of these medications  10  Call the office: If you are experiencing any of the following, fevers above 101 5°, significant nausea or vomiting, if the wound develops drainage and/or is excessive redness around the wound, or if you have significant diarrhea or other worsening symptoms  11  Pain: You may be given a prescription for pain  This will be given to the hospital, the day of surgery  12  Sexual Activity: You may resume sexual activity when you feel ready and comfortable and your incision is sealed and healed without apparent infection risk  13  Urination: If you haven't urinated in 6 hours, go directly to the ER for evaluation for urinary retention  14  Follow-up in 2 weeks          [facility]  Phone: [phone]

## 2020-07-29 NOTE — OP NOTE
Inguinal Hernia, Laparocopic, Procedure Note    Name: Kassie Mosqueda   : 1985  MRN: [id number]  Date: 2020    Indications: The patient presented with a history of  umbilical hernia    Pre-operative Diagnosis: umbilical hernia possible inguinal hernia    Post-operative Diagnosis: bilateral reducible direct and/or indirect inguinal hernia and umbilical hernia    Procedure: Laparoscopic repair of bilateral inguinal hernia with mesh, Laparoscopic assisted bilateral TAP block, open umbilical hernia repair (primary)    Surgeon: Matthias Mejia MD  Assistants: Marely Mike PA-C, no qualified resident available  PA was medically necessary for the surgical safety of the case, including suturing, retraction, hemostasis  Anesthesia: General endotracheal anesthesia    Procedure Details   The patient was seen again in the Holding Room  The risks, benefits, complications, treatment options, and expected outcomes were discussed with the patient  The possibilities of reaction to medication, pulmonary aspiration, perforation of viscus, bleeding, postoperative short or long term nerve entrapment causing pain,recurrent infection, the need for additional procedures, and development of a complication requiring transfusion or further operation were discussed with the patient and/or family  There was concurrence with the proposed plan, and informed consent was obtained  The site of surgery was properly noted/marked  The patient was taken to the Operating Room, identified as Kassie Mosqueda and the procedure verified as hernia repair  A Time Out was held and the above information confirmed  The patient was prepped and draped in a sterile fashion  A timeout was again performed  Local anesthesia was used in the incision  An umbilical incision was made and a Mamta Chars was passed around the umbilical stalk and this was  from underlying umbilical hernia sac using cautery   A jose m trocar was inserted into the abdomen via the umbilical hernia defect and the abdomen was insufflated to appropriate pressure  Two additional 5mm trocars were placed lateral to rectus muscle approximately at the level of the umbilicus  At this point the patient was placed into Trendelenburg position and noted to have bilateral inguinal hernia   A laparoscopic assisted bilateral TAP block was performed using a combination of lidocaine and marcaine  The peritoneum was incised from the medial umbilical fold out laterally past the internal ring on the right  Using peanut the superior flap was dissected  Next the direct space was mobilized by exposing Roscoe's ligament all the way along its length to the pubic tubercle  If a direct hernia defect was seen this was dissected and reduced  If there was indirect hernia sac this was carefully mobilized off the cord structures with care to avoid injury to the gonadal vessels or spermatic cord  The remainder of the inferior flap was created  At this point Bard 3-D max mesh was selected and placed into the preperitoneal space  The mesh was secured inferiorly at Roscoe's  Medially at the pubic tubercle, and laterally at the anterior abdominal wall  Of note no clips were placed lateral to the gonadal vessels or inferior to the iliopubic tract  The peritoneum was closed using running V-lock suture  The peritoneum was incised from the medial umbilical fold out laterally past the internal ring on the left  Using peanut the superior flap was dissected  Next the direct space was mobilized by exposing Roscoe's ligament all the way along its length to the pubic tubercle  If a direct hernia defect was seen this was dissected and reduced  If there was indirect hernia sac this was carefully mobilized off the cord structures with care to avoid injury to the gonadal vessels or spermatic cord  The remainder of the inferior flap was created   At this point Bard 3-D max mesh was selected and placed into the preperitoneal space  The mesh was secured inferiorly at Roscoe's  Medially at the pubic tubercle, and laterally at the anterior abdominal wall  Of note no clips were placed lateral to the gonadal vessels or inferior to the iliopubic tract  The peritoneum was closed using running V-lock suture  The umbilical hernia site was closed with multiple figure of eight 2-0 Prolene sutures  The wound was closed in multiple layers using 3-0 Vicryl sutures and the skin of all ports were closed using a 4-0 Monocryl subcuticular stitch  The wound was dressed with Histacryl  The patient was anatomically correct at the end of the procedure  The patient tolerated the procedure in good condition  Instrument, sponge, and needle counts were correct prior to closure and at the conclusion of the case  This text is generated with voice recognition software  There may be translation, syntax,  or grammatical errors  If you have any questions, please contact the dictating provider  Findings: bilateral reducible direct and/or indirect inguinal hernia and umbilical hernia    Estimated Blood Loss: Minimal       Specimens: All specimens were sent for pathology  * No orders in the log *         Complications: None; patient tolerated the procedure well             Disposition: PACU            Condition: stable    Signature:   Ike Pagan MD  Date: 7/29/2020 Time: 3:33 PM

## 2020-08-10 ENCOUNTER — OFFICE VISIT (OUTPATIENT)
Dept: SURGERY | Facility: HOSPITAL | Age: 35
End: 2020-08-10

## 2020-08-10 VITALS
WEIGHT: 206 LBS | DIASTOLIC BLOOD PRESSURE: 84 MMHG | TEMPERATURE: 99.2 F | HEIGHT: 68 IN | SYSTOLIC BLOOD PRESSURE: 121 MMHG | BODY MASS INDEX: 31.22 KG/M2

## 2020-08-10 DIAGNOSIS — Z09 POSTOP CHECK: Primary | ICD-10-CM

## 2020-08-10 PROCEDURE — 99024 POSTOP FOLLOW-UP VISIT: CPT | Performed by: SURGERY

## 2020-08-10 PROCEDURE — 3008F BODY MASS INDEX DOCD: CPT | Performed by: SURGERY

## 2020-08-10 NOTE — PROGRESS NOTES
Seen and examined no acute events doing well no issues  avss afebrile  Soft +BS ND NT incision cdi    S/p b/l ing hernia and UHR    Cont light duty for two additonal weeks, f/u prn

## 2020-08-17 ENCOUNTER — TELEPHONE (OUTPATIENT)
Dept: SURGERY | Facility: HOSPITAL | Age: 35
End: 2020-08-17

## 2020-08-17 NOTE — TELEPHONE ENCOUNTER
Two day post op call  Spoke to patient  Patient is doing well  Pain at highest level is a 3-4  Pain controlled, patient ambulating well  Patient has no pain medication left  Used stool softener to stimulate bowel movement  No other questions or concerns  Reminded patient of post op appointment and to call if there are any questions or concerns prior to appointment

## 2021-03-13 ENCOUNTER — APPOINTMENT (EMERGENCY)
Dept: CT IMAGING | Facility: HOSPITAL | Age: 36
End: 2021-03-13
Payer: COMMERCIAL

## 2021-03-13 ENCOUNTER — APPOINTMENT (EMERGENCY)
Dept: RADIOLOGY | Facility: HOSPITAL | Age: 36
End: 2021-03-13
Payer: COMMERCIAL

## 2021-03-13 ENCOUNTER — HOSPITAL ENCOUNTER (EMERGENCY)
Facility: HOSPITAL | Age: 36
Discharge: HOME | End: 2021-03-13
Attending: EMERGENCY MEDICINE | Admitting: EMERGENCY MEDICINE
Payer: COMMERCIAL

## 2021-03-13 VITALS
OXYGEN SATURATION: 97 % | SYSTOLIC BLOOD PRESSURE: 114 MMHG | RESPIRATION RATE: 21 BRPM | TEMPERATURE: 97.4 F | HEART RATE: 80 BPM | DIASTOLIC BLOOD PRESSURE: 74 MMHG

## 2021-03-13 DIAGNOSIS — R07.89 ATYPICAL CHEST PAIN: Primary | ICD-10-CM

## 2021-03-13 LAB
ALBUMIN SERPL BCP-MCNC: 3.9 G/DL (ref 3.5–5)
ALP SERPL-CCNC: 116 U/L (ref 46–116)
ALT SERPL W P-5'-P-CCNC: 35 U/L (ref 12–78)
AMPHETAMINES SERPL QL SCN: NEGATIVE
ANION GAP SERPL CALCULATED.3IONS-SCNC: 8 MMOL/L (ref 4–13)
APTT PPP: 36 SECONDS (ref 23–37)
AST SERPL W P-5'-P-CCNC: 21 U/L (ref 5–45)
BARBITURATES UR QL: NEGATIVE
BASOPHILS # BLD AUTO: 0.05 THOUSANDS/ΜL (ref 0–0.1)
BASOPHILS NFR BLD AUTO: 1 % (ref 0–1)
BENZODIAZ UR QL: NEGATIVE
BILIRUB SERPL-MCNC: 0.3 MG/DL (ref 0.2–1)
BUN SERPL-MCNC: 11 MG/DL (ref 5–25)
CALCIUM SERPL-MCNC: 8.7 MG/DL (ref 8.3–10.1)
CHLORIDE SERPL-SCNC: 102 MMOL/L (ref 100–108)
CO2 SERPL-SCNC: 31 MMOL/L (ref 21–32)
COCAINE UR QL: NEGATIVE
CREAT SERPL-MCNC: 0.85 MG/DL (ref 0.6–1.3)
EOSINOPHIL # BLD AUTO: 0.16 THOUSAND/ΜL (ref 0–0.61)
EOSINOPHIL NFR BLD AUTO: 2 % (ref 0–6)
ERYTHROCYTE [DISTWIDTH] IN BLOOD BY AUTOMATED COUNT: 12.4 % (ref 11.6–15.1)
GFR SERPL CREATININE-BSD FRML MDRD: 114 ML/MIN/1.73SQ M
GLUCOSE SERPL-MCNC: 97 MG/DL (ref 65–140)
HCT VFR BLD AUTO: 49.3 % (ref 36.5–49.3)
HGB BLD-MCNC: 16 G/DL (ref 12–17)
IMM GRANULOCYTES # BLD AUTO: 0.03 THOUSAND/UL (ref 0–0.2)
IMM GRANULOCYTES NFR BLD AUTO: 0 % (ref 0–2)
INR PPP: 0.99 (ref 0.84–1.19)
LYMPHOCYTES # BLD AUTO: 2.27 THOUSANDS/ΜL (ref 0.6–4.47)
LYMPHOCYTES NFR BLD AUTO: 24 % (ref 14–44)
MAGNESIUM SERPL-MCNC: 2.5 MG/DL (ref 1.6–2.6)
MCH RBC QN AUTO: 27.4 PG (ref 26.8–34.3)
MCHC RBC AUTO-ENTMCNC: 32.5 G/DL (ref 31.4–37.4)
MCV RBC AUTO: 84 FL (ref 82–98)
METHADONE UR QL: NEGATIVE
MONOCYTES # BLD AUTO: 0.89 THOUSAND/ΜL (ref 0.17–1.22)
MONOCYTES NFR BLD AUTO: 9 % (ref 4–12)
NEUTROPHILS # BLD AUTO: 6.2 THOUSANDS/ΜL (ref 1.85–7.62)
NEUTS SEG NFR BLD AUTO: 64 % (ref 43–75)
NRBC BLD AUTO-RTO: 0 /100 WBCS
NT-PROBNP SERPL-MCNC: 36 PG/ML
OPIATES UR QL SCN: NEGATIVE
OXYCODONE+OXYMORPHONE UR QL SCN: NEGATIVE
PCP UR QL: NEGATIVE
PLATELET # BLD AUTO: 316 THOUSANDS/UL (ref 149–390)
PMV BLD AUTO: 9.9 FL (ref 8.9–12.7)
POTASSIUM SERPL-SCNC: 3.7 MMOL/L (ref 3.5–5.3)
PROT SERPL-MCNC: 8.4 G/DL (ref 6.4–8.2)
PROTHROMBIN TIME: 13.1 SECONDS (ref 11.6–14.5)
RBC # BLD AUTO: 5.85 MILLION/UL (ref 3.88–5.62)
SODIUM SERPL-SCNC: 141 MMOL/L (ref 136–145)
THC UR QL: POSITIVE
TROPONIN I SERPL-MCNC: <0.02 NG/ML
TROPONIN I SERPL-MCNC: <0.02 NG/ML
WBC # BLD AUTO: 9.6 THOUSAND/UL (ref 4.31–10.16)

## 2021-03-13 PROCEDURE — 74174 CTA ABD&PLVS W/CONTRAST: CPT

## 2021-03-13 PROCEDURE — 99285 EMERGENCY DEPT VISIT HI MDM: CPT | Performed by: EMERGENCY MEDICINE

## 2021-03-13 PROCEDURE — 80053 COMPREHEN METABOLIC PANEL: CPT | Performed by: EMERGENCY MEDICINE

## 2021-03-13 PROCEDURE — 83735 ASSAY OF MAGNESIUM: CPT | Performed by: EMERGENCY MEDICINE

## 2021-03-13 PROCEDURE — 36415 COLL VENOUS BLD VENIPUNCTURE: CPT | Performed by: EMERGENCY MEDICINE

## 2021-03-13 PROCEDURE — 99285 EMERGENCY DEPT VISIT HI MDM: CPT

## 2021-03-13 PROCEDURE — 71045 X-RAY EXAM CHEST 1 VIEW: CPT

## 2021-03-13 PROCEDURE — 83880 ASSAY OF NATRIURETIC PEPTIDE: CPT | Performed by: EMERGENCY MEDICINE

## 2021-03-13 PROCEDURE — 93005 ELECTROCARDIOGRAM TRACING: CPT

## 2021-03-13 PROCEDURE — 85730 THROMBOPLASTIN TIME PARTIAL: CPT | Performed by: EMERGENCY MEDICINE

## 2021-03-13 PROCEDURE — G1004 CDSM NDSC: HCPCS

## 2021-03-13 PROCEDURE — 96361 HYDRATE IV INFUSION ADD-ON: CPT

## 2021-03-13 PROCEDURE — 84484 ASSAY OF TROPONIN QUANT: CPT | Performed by: EMERGENCY MEDICINE

## 2021-03-13 PROCEDURE — 85025 COMPLETE CBC W/AUTO DIFF WBC: CPT | Performed by: EMERGENCY MEDICINE

## 2021-03-13 PROCEDURE — 85610 PROTHROMBIN TIME: CPT | Performed by: EMERGENCY MEDICINE

## 2021-03-13 PROCEDURE — 96374 THER/PROPH/DIAG INJ IV PUSH: CPT

## 2021-03-13 PROCEDURE — 80307 DRUG TEST PRSMV CHEM ANLYZR: CPT | Performed by: EMERGENCY MEDICINE

## 2021-03-13 PROCEDURE — 71275 CT ANGIOGRAPHY CHEST: CPT

## 2021-03-13 PROCEDURE — 96375 TX/PRO/DX INJ NEW DRUG ADDON: CPT

## 2021-03-13 RX ORDER — ONDANSETRON 2 MG/ML
4 INJECTION INTRAMUSCULAR; INTRAVENOUS ONCE
Status: COMPLETED | OUTPATIENT
Start: 2021-03-13 | End: 2021-03-13

## 2021-03-13 RX ORDER — NITROGLYCERIN 0.4 MG/1
0.4 TABLET SUBLINGUAL ONCE
Status: COMPLETED | OUTPATIENT
Start: 2021-03-13 | End: 2021-03-13

## 2021-03-13 RX ORDER — MORPHINE SULFATE 4 MG/ML
4 INJECTION, SOLUTION INTRAMUSCULAR; INTRAVENOUS ONCE
Status: COMPLETED | OUTPATIENT
Start: 2021-03-13 | End: 2021-03-13

## 2021-03-13 RX ADMIN — ONDANSETRON 4 MG: 2 INJECTION INTRAMUSCULAR; INTRAVENOUS at 07:20

## 2021-03-13 RX ADMIN — NITROGLYCERIN 0.4 MG: 0.4 TABLET SUBLINGUAL at 07:10

## 2021-03-13 RX ADMIN — MORPHINE SULFATE 4 MG: 4 INJECTION INTRAVENOUS at 05:54

## 2021-03-13 RX ADMIN — IOHEXOL 100 ML: 350 INJECTION, SOLUTION INTRAVENOUS at 06:54

## 2021-03-13 RX ADMIN — SODIUM CHLORIDE 1000 ML: 0.9 INJECTION, SOLUTION INTRAVENOUS at 04:54

## 2021-03-13 NOTE — ED CARE HANDOFF
Emergency Department Sign Out Note        Sign out and transfer of care from Dr Rogers Galarza  See Separate Emergency Department note  The patient, Lui Ware, was evaluated by the previous provider for chest pain with left arm pain  Workup Completed:  EKG, labs, risk strat    ED Course / Workup Pending (followup):  Delta EKG -normal sinus rhythm at 74 beats per minute  There are no new acute ischemic changes when compared to previous  Delta Trop: <0 02  CTA dissection: no acute path  Re-eval - patient stable with some improved mild parasternal tightness which is worse with deep inspiration        HEART Risk Score      Most Recent Value   Heart Score Risk Calculator   History  0 Filed at: 03/13/2021 0617   ECG  1 Filed at: 03/13/2021 0617   Age  0 Filed at: 03/13/2021 0617   Risk Factors  0 Filed at: 03/13/2021 0617   Troponin  0 Filed at: 03/13/2021 0617   HEART Score  1 Filed at: 03/13/2021 8075             Results for orders placed or performed during the hospital encounter of 03/13/21   CBC and differential   Result Value Ref Range    WBC 9 60 4 31 - 10 16 Thousand/uL    RBC 5 85 (H) 3 88 - 5 62 Million/uL    Hemoglobin 16 0 12 0 - 17 0 g/dL    Hematocrit 49 3 36 5 - 49 3 %    MCV 84 82 - 98 fL    MCH 27 4 26 8 - 34 3 pg    MCHC 32 5 31 4 - 37 4 g/dL    RDW 12 4 11 6 - 15 1 %    MPV 9 9 8 9 - 12 7 fL    Platelets 659 985 - 196 Thousands/uL    nRBC 0 /100 WBCs    Neutrophils Relative 64 43 - 75 %    Immat GRANS % 0 0 - 2 %    Lymphocytes Relative 24 14 - 44 %    Monocytes Relative 9 4 - 12 %    Eosinophils Relative 2 0 - 6 %    Basophils Relative 1 0 - 1 %    Neutrophils Absolute 6 20 1 85 - 7 62 Thousands/µL    Immature Grans Absolute 0 03 0 00 - 0 20 Thousand/uL    Lymphocytes Absolute 2 27 0 60 - 4 47 Thousands/µL    Monocytes Absolute 0 89 0 17 - 1 22 Thousand/µL    Eosinophils Absolute 0 16 0 00 - 0 61 Thousand/µL    Basophils Absolute 0 05 0 00 - 0 10 Thousands/µL   Protime-INR   Result Value Ref Range    Protime 13 1 11 6 - 14 5 seconds    INR 0 99 0 84 - 1 19   APTT   Result Value Ref Range    PTT 36 23 - 37 seconds   Comprehensive metabolic panel   Result Value Ref Range    Sodium 141 136 - 145 mmol/L    Potassium 3 7 3 5 - 5 3 mmol/L    Chloride 102 100 - 108 mmol/L    CO2 31 21 - 32 mmol/L    ANION GAP 8 4 - 13 mmol/L    BUN 11 5 - 25 mg/dL    Creatinine 0 85 0 60 - 1 30 mg/dL    Glucose 97 65 - 140 mg/dL    Calcium 8 7 8 3 - 10 1 mg/dL    AST 21 5 - 45 U/L    ALT 35 12 - 78 U/L    Alkaline Phosphatase 116 46 - 116 U/L    Total Protein 8 4 (H) 6 4 - 8 2 g/dL    Albumin 3 9 3 5 - 5 0 g/dL    Total Bilirubin 0 30 0 20 - 1 00 mg/dL    eGFR 114 ml/min/1 73sq m   Magnesium   Result Value Ref Range    Magnesium 2 5 1 6 - 2 6 mg/dL   Troponin I   Result Value Ref Range    Troponin I <0 02 <=0 04 ng/mL   Troponin I repeat in 3hrs   Result Value Ref Range    Troponin I <0 02 <=0 04 ng/mL   NT-BNP PRO   Result Value Ref Range    NT-proBNP 36 <125 pg/mL   Rapid drug screen, urine   Result Value Ref Range    Amph/Meth UR Negative Negative    Barbiturate Ur Negative Negative    Benzodiazepine Urine Negative Negative    Cocaine Urine Negative Negative    Methadone Urine Negative Negative    Opiate Urine Negative Negative    PCP Ur Negative Negative    THC Urine Positive (A) Negative    Oxycodone Urine Negative Negative     CTA dissection protocol chest/abdomen/pelvis   Final Result   No CT evidence of aortic dissection  Workstation performed: [id number]      XR chest 1 view portable   Final Result   No acute cardiopulmonary disease  Workstation performed: [id number]                 discussed plan for outpatient follow-up with primary care  Discussed the patient is low risk for acute coronary syndrome utilizing pathway  The patient (and any family present) verbalized understanding of the discharge instructions and warnings that would necessitate return to the Emergency Department      All questions were answered prior to discharge  ED Course as of Mar 13 0920   Sat Mar 13, 2021   0731 Patient chest pain resolved after SL NTG        Procedures  MDM    Disposition  Final diagnoses:   Atypical chest pain     Time reflects when diagnosis was documented in both MDM as applicable and the Disposition within this note     Time User Action Codes Description Comment    3/13/2021  7:08 AM Jenna Mosher Add [R07 89] Atypical chest pain       ED Disposition     ED Disposition Condition Date/Time Comment    Discharge Stable Sat Mar 13, 2021  9:10 AM Marleen Rueda discharge to home/self care  Follow-up Information     Follow up With Specialties Details Why Contact Info Additional [address], [address], Nurse Practitioner Schedule an appointment as soon as possible for a visit   [address] [facility] [address]  [facility] [phone]        [address] Emergency Department Emergency Medicine  If symptoms worsen [address] [place], [phone]  [address] Emergency Department, [address], [place] [address]        Patient's Medications   Discharge Prescriptions    No medications on file     No discharge procedures on file         ED Provider  Electronically Signed by     Melo Chambers DO  03/13/21 4966

## 2021-03-13 NOTE — ED NOTES
Pt arranged transport home prior to morphine administration        Les Boudreaux, SAMUEL  03/13/21 0443

## 2021-03-13 NOTE — ED PROVIDER NOTES
History  Chief Complaint   Patient presents with    Chest Pain     chest pain since last night  Left arm pain and mid chest pain  29 yo M with PMH of spina bifida presents to ED with CP  Started last night around 11pm while he was playing video games  Feels like pressure  7/10 currently  Has had CP in past, but not like this, usually attributed to muscle pulls (he is a )  Feels like he is taking "choppy short breaths" but doesn't feel SOB  No fevers or cough  No leg pain/swelling or h/o PE/DVT  Smokes marijuana, no other illicit drugs  Occasional ETOH, no binge drinking recently  No relation to food  No GI/ sx  Pain radiates to back, left arm, with some numbness L arm  Had a mild HA before as well, but resolved with tylenol  Grandparents had cardiac issues, but no family h/o cardiac issues at a young age         History provided by:  Patient and medical records   used: No    Chest Pain  Pain location:  L chest  Pain quality: pressure    Pain radiates to:  Upper back and L arm  Pain radiates to the back: yes    Pain severity:  Moderate  Onset quality:  Gradual  Timing:  Constant  Progression:  Worsening  Chronicity:  New  Context: at rest    Relieved by:  Nothing  Worsened by:  Deep breathing  Associated symptoms: headache and numbness    Associated symptoms: no abdominal pain, no altered mental status, no anorexia, no anxiety, no back pain, no claudication, no cough, no diaphoresis, no dizziness, no dysphagia, no fatigue, no fever, no heartburn, no lower extremity edema, no nausea, no near-syncope, no orthopnea, no palpitations, no PND, no shortness of breath, no syncope, not vomiting and no weakness    Risk factors: male sex    Risk factors: no aortic disease, no birth control, no coronary artery disease, no diabetes mellitus, no Kole-Danlos syndrome, no high cholesterol, no hypertension, no immobilization, no Marfan's syndrome, not obese, not pregnant, no prior DVT/PE, no smoking and no surgery        Prior to Admission Medications   Prescriptions Last Dose Informant Patient Reported? Taking? LORazepam (ATIVAN) 0 5 mg tablet  Self No No   Sig: Take 1 tablet (0 5 mg total) by mouth every 8 (eight) hours as needed for anxiety   acetaminophen (TYLENOL) 325 mg tablet   Yes No   Sig: Take 650 mg by mouth every 6 (six) hours as needed for mild pain   clotrimazole (LOTRIMIN) 1 % cream   No No   Sig: Apply topically 2 (two) times a day   ibuprofen (MOTRIN) 600 mg tablet   No No   Sig: Take 1 tablet (600 mg total) by mouth every 6 (six) hours as needed for mild pain or moderate pain   sertraline (ZOLOFT) 100 mg tablet   No No   Sig: Take 1 tablet (100 mg total) by mouth daily      Facility-Administered Medications: None       Past Medical History:   Diagnosis Date    Hernia, umbilical     Spina bifida ([facility] [address] )        Past Surgical History:   Procedure Laterality Date    DENTAL SURGERY      HERNIA REPAIR Bilateral 7/28/2020    Procedure: Melanie Rust;  Surgeon: Sandy Razo MD;  Location:  MAIN OR;  Service: General    OH REPAIR UMBILICAL GYQM,1+G/P,TTKKQ N/A 7/28/2020    Procedure: REPAIR HERNIA UMBILICAL;  Surgeon: Sandy Razo MD;  Location:  MAIN OR;  Service: General    VASECTOMY  2018       Family History   Problem Relation Age of Onset    Bipolar disorder Brother     Heart disease Maternal Grandmother     Hypertension Maternal Grandmother     Hypertension Maternal Grandfather     Heart disease Maternal Grandfather     Cancer Maternal Grandfather     Hypertension Paternal Grandmother     Heart disease Paternal Grandmother      I have reviewed and agree with the history as documented      E-Cigarette/Vaping    E-Cigarette Use Former User     Comments 1 cartridge week x 6 months then quit      E-Cigarette/Vaping Substances    Flavoring Yes      Social History     Tobacco Use    Smoking status: Former Smoker     Packs/day: 1 00 Years: 12 00     Pack years: 12 00     Quit date:      Years since quittin 2    Smokeless tobacco: Never Used   Substance Use Topics    Alcohol use: Yes     Frequency: 2-3 times a week     Drinks per session: 1 or 2     Binge frequency: Never     Comment: social     Drug use: Yes     Types: Marijuana     Comment: daily        Review of Systems   Constitutional: Negative for chills, diaphoresis, fatigue, fever and unexpected weight change  HENT: Negative for congestion, ear pain, rhinorrhea, sore throat, trouble swallowing and voice change  Eyes: Negative for pain and visual disturbance  Respiratory: Negative for cough, chest tightness and shortness of breath  Cardiovascular: Positive for chest pain  Negative for palpitations, orthopnea, claudication, leg swelling, syncope, PND and near-syncope  Gastrointestinal: Negative for abdominal pain, anorexia, blood in stool, constipation, diarrhea, heartburn, nausea and vomiting  Genitourinary: Negative for difficulty urinating, dysuria, flank pain and hematuria  Musculoskeletal: Negative for arthralgias, back pain and neck pain  Skin: Negative for rash  Neurological: Positive for numbness and headaches  Negative for dizziness, syncope, weakness and light-headedness  Psychiatric/Behavioral: Negative for confusion and suicidal ideas  The patient is not nervous/anxious  Physical Exam  Physical Exam  Vitals signs and nursing note reviewed  Constitutional:       General: He is not in acute distress  Appearance: He is well-developed  He is not diaphoretic  HENT:      Head: Normocephalic and atraumatic  Right Ear: External ear normal       Left Ear: External ear normal       Nose: Nose normal    Eyes:      General: No scleral icterus  Right eye: No discharge  Left eye: No discharge  Conjunctiva/sclera: Conjunctivae normal       Pupils: Pupils are equal, round, and reactive to light     Neck: Musculoskeletal: Normal range of motion and neck supple  Vascular: No JVD  Trachea: No tracheal deviation  Cardiovascular:      Rate and Rhythm: Normal rate and regular rhythm  Pulses:           Radial pulses are 2+ on the right side and 2+ on the left side  Heart sounds: Normal heart sounds  No murmur  No friction rub  No gallop  Pulmonary:      Effort: Pulmonary effort is normal  No respiratory distress  Breath sounds: Normal breath sounds  No stridor  No wheezing or rales  Chest:      Chest wall: No mass, deformity or tenderness  Abdominal:      General: Bowel sounds are normal  There is no distension  Palpations: Abdomen is soft  Tenderness: There is no abdominal tenderness  There is no guarding or rebound  Musculoskeletal: Normal range of motion  General: No tenderness or deformity  Right lower leg: He exhibits no tenderness  No edema  Left lower leg: He exhibits no tenderness  No edema  Lymphadenopathy:      Cervical: No cervical adenopathy  Skin:     General: Skin is warm and dry  Capillary Refill: Capillary refill takes less than 2 seconds  Findings: No rash  Neurological:      General: No focal deficit present  Mental Status: He is alert and oriented to person, place, and time  Cranial Nerves: No cranial nerve deficit  Sensory: No sensory deficit        Coordination: Coordination normal    Psychiatric:         Behavior: Behavior normal          Vital Signs  ED Triage Vitals [03/13/21 0446]   Temperature Pulse Respirations Blood Pressure SpO2   (!) 97 4 °F (36 3 °C) 78 20 140/92 100 %      Temp Source Heart Rate Source Patient Position - Orthostatic VS BP Location FiO2 (%)   Tympanic Monitor Lying Left arm --      Pain Score       7           Vitals:    03/13/21 0446 03/13/21 0600 03/13/21 0630 03/13/21 0700   BP: 140/92 138/90 123/83 122/83   Pulse: 78 86 81 82   Patient Position - Orthostatic VS: Lying Visual Acuity      ED Medications  Medications   nitroglycerin (NITROSTAT) SL tablet 0 4 mg (has no administration in time range)   sodium chloride 0 9 % bolus 1,000 mL (0 mL Intravenous Stopped 3/13/21 0554)   morphine (PF) 4 mg/mL injection 4 mg (4 mg Intravenous Given 3/13/21 0554)   iohexol (OMNIPAQUE) 350 MG/ML injection (MULTI-DOSE) 100 mL (100 mL Intravenous Given 3/13/21 0654)       Diagnostic Studies  Results Reviewed     Procedure Component Value Units Date/Time    Rapid drug screen, urine [[id number]]  (Abnormal) Collected: 03/13/21 0545    Lab Status: Final result Specimen: Urine, Clean Catch Updated: 03/13/21 0631     Amph/Meth UR Negative     Barbiturate Ur Negative     Benzodiazepine Urine Negative     Cocaine Urine Negative     Methadone Urine Negative     Opiate Urine Negative     PCP Ur Negative     THC Urine Positive     Oxycodone Urine Negative    Narrative:      Presumptive report  If requested, specimen will be sent to reference lab for confirmation  FOR MEDICAL PURPOSES ONLY  IF CONFIRMATION NEEDED PLEASE CONTACT THE LAB WITHIN 5 DAYS      Drug Screen Cutoff Levels:  AMPHETAMINE/METHAMPHETAMINES  1000 ng/mL  BARBITURATES     200 ng/mL  BENZODIAZEPINES     200 ng/mL  COCAINE      300 ng/mL  METHADONE      300 ng/mL  OPIATES      300 ng/mL  PHENCYCLIDINE     25 ng/mL  THC       50 ng/mL  OXYCODONE      100 ng/mL    NT-BNP PRO [[id number]]  (Normal) Collected: 03/13/21 0452    Lab Status: Final result Specimen: Blood from Arm, Left Updated: 03/13/21 0622     NT-proBNP 36 pg/mL     Comprehensive metabolic panel [[id number]]  (Abnormal) Collected: 03/13/21 0452    Lab Status: Final result Specimen: Blood from Arm, Left Updated: 03/13/21 0539     Sodium 141 mmol/L      Potassium 3 7 mmol/L      Chloride 102 mmol/L      CO2 31 mmol/L      ANION GAP 8 mmol/L      BUN 11 mg/dL      Creatinine 0 85 mg/dL      Glucose 97 mg/dL      Calcium 8 7 mg/dL      AST 21 U/L      ALT 35 U/L      Alkaline Phosphatase 116 U/L      Total Protein 8 4 g/dL      Albumin 3 9 g/dL      Total Bilirubin 0 30 mg/dL      eGFR 114 ml/min/1 73sq m     Narrative:      [place] guidelines for Chronic Kidney Disease (CKD):     Stage 1 with normal or high GFR (GFR > 90 mL/min/1 73 square meters)    Stage 2 Mild CKD (GFR = 60-89 mL/min/1 73 square meters)    Stage 3A Moderate CKD (GFR = 45-59 mL/min/1 73 square meters)    Stage 3B Moderate CKD (GFR = 30-44 mL/min/1 73 square meters)    Stage 4 Severe CKD (GFR = 15-29 mL/min/1 73 square meters)    Stage 5 End Stage CKD (GFR <15 mL/min/1 73 square meters)  Note: GFR calculation is accurate only with a steady state creatinine    Magnesium [[id number]]  (Normal) Collected: 03/13/21 0452    Lab Status: Final result Specimen: Blood from Arm, Left Updated: 03/13/21 0539     Magnesium 2 5 mg/dL     Troponin I [[id number]]  (Normal) Collected: 03/13/21 0453    Lab Status: Final result Specimen: Blood from Arm, Left Updated: 03/13/21 0536     Troponin I <0 02 ng/mL     Protime-INR [[id number]]  (Normal) Collected: 03/13/21 0452    Lab Status: Final result Specimen: Blood from Arm, Left Updated: 03/13/21 0527     Protime 13 1 seconds      INR 0 99    APTT [[id number]]  (Normal) Collected: 03/13/21 0452    Lab Status: Final result Specimen: Blood from Arm, Left Updated: 03/13/21 0527     PTT 36 seconds     CBC and differential [[id number]]  (Abnormal) Collected: 03/13/21 0452    Lab Status: Final result Specimen: Blood from Arm, Left Updated: 03/13/21 0513     WBC 9 60 Thousand/uL      RBC 5 85 Million/uL      Hemoglobin 16 0 g/dL      Hematocrit 49 3 %      MCV 84 fL      MCH 27 4 pg      MCHC 32 5 g/dL      RDW 12 4 %      MPV 9 9 fL      Platelets 227 Thousands/uL      nRBC 0 /100 WBCs      Neutrophils Relative 64 %      Immat GRANS % 0 %      Lymphocytes Relative 24 %      Monocytes Relative 9 %      Eosinophils Relative 2 %      Basophils Relative 1 % Neutrophils Absolute 6 20 Thousands/µL      Immature Grans Absolute 0 03 Thousand/uL      Lymphocytes Absolute 2 27 Thousands/µL      Monocytes Absolute 0 89 Thousand/µL      Eosinophils Absolute 0 16 Thousand/µL      Basophils Absolute 0 05 Thousands/µL     Troponin I repeat in 3hrs [[id number]]     Lab Status: No result Specimen: Blood                  XR chest 1 view portable   Final Result by Julito Durant MD (03/13 0354)   No acute cardiopulmonary disease  Workstation performed: [id number]      CTA dissection protocol chest/abdomen/pelvis    (Results Pending)              Procedures  ECG 12 Lead Documentation Only    Date/Time: 3/13/2021 4:51 AM  Performed by: Olinda Garrett MD  Authorized by: Olinda Garrett MD     Indications / Diagnosis:  Cp  ECG reviewed by me, the ED Provider: yes    Patient location:  ED  Previous ECG:     Previous ECG:  Unavailable    Comparison to cardiac monitor: Yes    Interpretation:     Interpretation: non-specific    Rate:     ECG rate:  90    ECG rate assessment: normal    Rhythm:     Rhythm: sinus rhythm    Ectopy:     Ectopy: none    QRS:     QRS axis:  Normal    QRS intervals:  Normal  Conduction:     Conduction: normal    ST segments:     ST segments:  Non-specific  T waves:     T waves: non-specific               ED Course  ED Course as of Mar 13 0708   Sat Mar 13, 2021   0705 Pain slightly improved, now 5/10  Discussed results, and pend CT and delta trop with patient  HEART 1  If workup benign, can likely be d/c home to f/u with PCP  Pt agreeable  S/o to Dr Emma Espitia                  HEART Risk Score      Most Recent Value   Heart Score Risk Calculator   History  0 Filed at: 03/13/2021 0617   ECG  1 Filed at: 03/13/2021 0617   Age  0 Filed at: 03/13/2021 0617   Risk Factors  0 Filed at: 03/13/2021 0617   Troponin  0 Filed at: 03/13/2021 0617   HEART Score  1 Filed at: 03/13/2021 0617                      SBIRT 20yo+      Most Recent Value   SBIRT (25 yo +)   In order to provide better care to our patients, we are screening all of our patients for alcohol and drug use  Would it be okay to ask you these screening questions? Yes Filed at: 03/13/2021 0707   Initial Alcohol Screen: US AUDIT-C    1  How often do you have a drink containing alcohol?  0 Filed at: 03/13/2021 0707   2  How many drinks containing alcohol do you have on a typical day you are drinking? 0 Filed at: 03/13/2021 0707   3a  Male UNDER 65: How often do you have five or more drinks on one occasion? 0 Filed at: 03/13/2021 0707   3b  FEMALE Any Age, or MALE 65+: How often do you have 4 or more drinks on one occassion? 0 Filed at: 03/13/2021 0707   Audit-C Score  0 Filed at: 03/13/2021 4053   RUBA: How many times in the past year have you    Used an illegal drug or used a prescription medication for non-medical reasons?   Never Filed at: 03/13/2021 0707                    MDM  Number of Diagnoses or Management Options  Atypical chest pain: new and requires workup     Amount and/or Complexity of Data Reviewed  Clinical lab tests: ordered and reviewed  Tests in the radiology section of CPT®: ordered and reviewed  Tests in the medicine section of CPT®: reviewed and ordered  Review and summarize past medical records: yes  Discuss the patient with other providers: yes  Independent visualization of images, tracings, or specimens: yes    Risk of Complications, Morbidity, and/or Mortality  Presenting problems: moderate  Diagnostic procedures: low  Management options: low    Patient Progress  Patient progress: improved      Disposition  Final diagnoses:   Atypical chest pain     Time reflects when diagnosis was documented in both MDM as applicable and the Disposition within this note     Time User Action Codes Description Comment    3/13/2021  7:08 AM Petrona Clifton Add [R07 89] Atypical chest pain       ED Disposition     None      Follow-up Information     Follow up With Specialties Details Why Contact Info Additional [address] [place], [address], Nurse Practitioner Schedule an appointment as soon as possible for a visit   [address]  [address]  [facility] [phone]        [address] Emergency Department Emergency Medicine  If symptoms worsen [address] [place], [phone]  [address] [facility] Emergency Department, [address] [place]          Patient's Medications   Discharge Prescriptions    No medications on file     No discharge procedures on file      PDMP Review       Value Time User    PDMP Reviewed  Yes 7/28/2020  8:22 AM Ying Miek PA-C          ED Provider  Electronically Signed by           Andie Chowdhury MD  03/13/21 0579

## 2021-03-13 NOTE — ED NOTES
Patient states pain down to a 3/10 from 7/10  States he feels nauseous   Patient ordered zofran by provider prior to next dose administration     Rojelio Godwin RN  03/13/21 9479

## 2021-03-13 NOTE — ED NOTES
Second nitro dose administered at this time      [facility]  03/13/21 3149

## 2021-03-15 LAB
ATRIAL RATE: 74 BPM
ATRIAL RATE: 90 BPM
P AXIS: 31 DEGREES
P AXIS: 38 DEGREES
PR INTERVAL: 162 MS
PR INTERVAL: 166 MS
QRS AXIS: -25 DEGREES
QRS AXIS: -27 DEGREES
QRSD INTERVAL: 90 MS
QRSD INTERVAL: 92 MS
QT INTERVAL: 364 MS
QT INTERVAL: 378 MS
QTC INTERVAL: 419 MS
QTC INTERVAL: 445 MS
T WAVE AXIS: 28 DEGREES
T WAVE AXIS: 53 DEGREES
VENTRICULAR RATE: 74 BPM
VENTRICULAR RATE: 90 BPM

## 2021-03-15 PROCEDURE — 93010 ELECTROCARDIOGRAM REPORT: CPT | Performed by: INTERNAL MEDICINE

## 2021-03-16 ENCOUNTER — OFFICE VISIT (OUTPATIENT)
Dept: FAMILY MEDICINE CLINIC | Facility: CLINIC | Age: 36
End: 2021-03-16
Payer: COMMERCIAL

## 2021-03-16 VITALS
HEART RATE: 74 BPM | BODY MASS INDEX: 32.77 KG/M2 | WEIGHT: 208.8 LBS | TEMPERATURE: 99 F | HEIGHT: 67 IN | RESPIRATION RATE: 16 BRPM | SYSTOLIC BLOOD PRESSURE: 124 MMHG | DIASTOLIC BLOOD PRESSURE: 82 MMHG | OXYGEN SATURATION: 98 %

## 2021-03-16 DIAGNOSIS — Z23 IMMUNIZATION DUE: ICD-10-CM

## 2021-03-16 DIAGNOSIS — R53.83 OTHER FATIGUE: Primary | ICD-10-CM

## 2021-03-16 DIAGNOSIS — R00.2 PALPITATIONS: ICD-10-CM

## 2021-03-16 DIAGNOSIS — R07.89 ATYPICAL CHEST PAIN: ICD-10-CM

## 2021-03-16 PROCEDURE — 1036F TOBACCO NON-USER: CPT | Performed by: NURSE PRACTITIONER

## 2021-03-16 PROCEDURE — 99214 OFFICE O/P EST MOD 30 MIN: CPT | Performed by: NURSE PRACTITIONER

## 2021-03-16 PROCEDURE — 3725F SCREEN DEPRESSION PERFORMED: CPT | Performed by: NURSE PRACTITIONER

## 2021-03-16 PROCEDURE — 3008F BODY MASS INDEX DOCD: CPT | Performed by: NURSE PRACTITIONER

## 2021-03-16 PROCEDURE — 90715 TDAP VACCINE 7 YRS/> IM: CPT | Performed by: NURSE PRACTITIONER

## 2021-03-16 PROCEDURE — 90471 IMMUNIZATION ADMIN: CPT | Performed by: NURSE PRACTITIONER

## 2021-03-16 NOTE — PROGRESS NOTES
Assessment/Plan   Problem List Items Addressed This Visit     None      Visit Diagnoses     Other fatigue    -  Primary    Relevant Orders    TSH, 3rd generation    T4, free    T3, free    Iron, TIBC and Ferritin Panel    Vitamin B12    Sedimentation rate, automated    C-reactive protein    Lyme Total Antibody Profile with reflex to WB    Hemoglobin A1C W/EAG With Reflex To Glycomark(R)    Palpitations        Atypical chest pain        Immunization due        Relevant Orders    TDAP VACCINE GREATER THAN OR EQUAL TO 8YO IM (Completed)                Chief Complaint   Patient presents with    ER follow-up     patient was seen at [facility] on 03/13/2021 for chest pain  He reports that he only notes the pain when taking large breaths and at night sometimes when laying down, whereas his pain before felt like someone was sitting on his chest with shooting pain   Shortness of Breath     Shortness of breath with excertions over the past few months  He does note that he's gained some weight, but that this feel like more than just that    Loss of appitite     Over the last week or two   Hot/Cold sensory is off     Patient reports that in warm rooms, he feels cold and in cold rooms, he feels too hot  Subjective   Patient ID: Yudi Rodas is a 29 y o  male  Vitals:    03/16/21 1450   BP: 124/82   Pulse: 74   Resp: 16   Temp: 99 °F (37 2 °C)   SpO2: 98%     Stopped Zoloft months ago and now using marajuana "working better than Zoloft"      Shortness of Breath  This is a recurrent problem  The current episode started in the past 7 days  Episode frequency: "feels like I have to take a deep breath" with laying down  The problem has been waxing and waning  Associated symptoms include abdominal pain (epigastric discomfort) and chest pain  Pertinent negatives include no claudication, fever, headaches, leg swelling, rash, sputum production, swollen glands, syncope or wheezing  The symptoms are aggravated by lying flat  The patient has no known risk factors for DVT/PE  He has tried nothing (has had a chest xrayin ED several days ago) for the symptoms  The treatment provided no relief  There is no history of aspirin allergies, bronchiolitis, COPD, DVT or a heart failure  Fatigue  This is a new problem  The current episode started more than 1 month ago  The problem occurs constantly  The problem has been unchanged  Associated symptoms include abdominal pain (epigastric discomfort), chest pain and fatigue  Pertinent negatives include no arthralgias, congestion, fever, headaches, myalgias, nausea, rash or swollen glands  Nothing aggravates the symptoms  He has tried nothing for the symptoms  The treatment provided no relief  Chest Pain   This is a new (ER over weekend with Chest pain -negative cardiac work up, reports pain has improved) problem  The current episode started in the past 7 days  Associated symptoms include abdominal pain (epigastric discomfort), palpitations and shortness of breath  Pertinent negatives include no claudication, fever, headaches, nausea, sputum production or syncope  Pertinent negatives for past medical history include no DVT  The following portions of the patient's history were reviewed and updated as appropriate: allergies, current medications, past medical history, past social history, past surgical history and problem list     Review of Systems   Constitutional: Positive for activity change, appetite change and fatigue  Negative for fever  HENT: Negative  Negative for congestion  Eyes: Negative  Negative for photophobia, pain and itching  Respiratory: Positive for chest tightness and shortness of breath  Negative for sputum production and wheezing  Cardiovascular: Positive for chest pain and palpitations  Negative for claudication, leg swelling and syncope  Gastrointestinal: Positive for abdominal pain (epigastric discomfort)  Negative for constipation, diarrhea and nausea  Endocrine: Positive for cold intolerance and heat intolerance  Negative for polyuria  Genitourinary: Negative  Musculoskeletal: Negative  Negative for arthralgias and myalgias  Skin: Negative  Negative for rash  Allergic/Immunologic: Negative  Neurological: Negative  Negative for headaches  Hematological: Negative  Negative for adenopathy  Psychiatric/Behavioral: Positive for decreased concentration  Negative for sleep disturbance  The patient is nervous/anxious  Objective     Physical Exam  Vitals signs and nursing note reviewed  Constitutional:       Appearance: He is well-developed and normal weight  He is not ill-appearing, toxic-appearing or diaphoretic  HENT:      Head: Normocephalic and atraumatic  Mouth/Throat:      Pharynx: No pharyngeal swelling  Eyes:      Extraocular Movements: Extraocular movements intact  Neck:      Musculoskeletal: Normal range of motion  Vascular: No hepatojugular reflux  Trachea: No tracheal deviation  Cardiovascular:      Rate and Rhythm: Normal rate and regular rhythm  No extrasystoles are present  Pulses: Normal pulses  No decreased pulses  Heart sounds: Normal heart sounds  No murmur  Pulmonary:      Effort: Pulmonary effort is normal  No accessory muscle usage or respiratory distress  Breath sounds: Normal breath sounds  No decreased breath sounds, wheezing, rhonchi or rales  Chest:      Chest wall: No mass or crepitus  Abdominal:      General: Bowel sounds are normal       Palpations: Abdomen is soft  Musculoskeletal: Normal range of motion  Right lower leg: No edema  Left lower leg: No edema  Lymphadenopathy:      Cervical: No cervical adenopathy  Skin:     General: Skin is warm and dry  Neurological:      General: No focal deficit present  Mental Status: He is alert and oriented to person, place, and time     Psychiatric:         Mood and Affect: Mood normal          Behavior: Behavior is not agitated       No Known Allergies

## 2021-03-20 LAB
EST. AVERAGE GLUCOSE BLD GHB EST-MCNC: 100 (CALC)
EST. AVERAGE GLUCOSE BLD GHB EST-SCNC: 5.5 (CALC)
HBA1C MFR BLD: 5.1 % OF TOTAL HGB

## 2021-03-22 ENCOUNTER — TELEPHONE (OUTPATIENT)
Dept: FAMILY MEDICINE CLINIC | Facility: CLINIC | Age: 36
End: 2021-03-22

## 2021-03-22 DIAGNOSIS — E61.1 IRON DEFICIENCY: Primary | ICD-10-CM

## 2021-03-22 LAB
B BURGDOR AB SER IA-ACNC: <0.9 INDEX
CRP SERPL-MCNC: 21 MG/L
ERYTHROCYTE [SEDIMENTATION RATE] IN BLOOD BY WESTERGREN METHOD: 19 MM/H
FERRITIN SERPL-MCNC: 96 NG/ML (ref 38–380)
IRON SATN MFR SERPL: 18 % (CALC) (ref 20–48)
IRON SERPL-MCNC: 41 MCG/DL (ref 50–180)
T3FREE SERPL-MCNC: 2.9 PG/ML (ref 2.3–4.2)
T4 FREE SERPL-MCNC: 1.1 NG/DL (ref 0.8–1.8)
TIBC SERPL-MCNC: 234 MCG/DL (CALC) (ref 250–425)
TSH SERPL-ACNC: 1.7 MIU/L (ref 0.4–4.5)
VIT B12 SERPL-MCNC: 562 PG/ML (ref 200–1100)

## 2021-03-22 RX ORDER — FERROUS SULFATE TAB EC 324 MG (65 MG FE EQUIVALENT) 324 (65 FE) MG
324 TABLET DELAYED RESPONSE ORAL
Qty: 60 TABLET | Refills: 1 | Status: SHIPPED | OUTPATIENT
Start: 2021-03-22

## 2021-03-22 NOTE — TELEPHONE ENCOUNTER
Patient had seen his lab results through [facility] and had some concerns  He would like to be called with the results he can be reached at [phone]

## 2024-05-05 ENCOUNTER — HOSPITAL ENCOUNTER (EMERGENCY)
Facility: HOSPITAL | Age: 39
Discharge: HOME | End: 2024-05-05
Attending: EMERGENCY MEDICINE
Payer: COMMERCIAL

## 2024-05-05 VITALS
DIASTOLIC BLOOD PRESSURE: 85 MMHG | RESPIRATION RATE: 18 BRPM | HEART RATE: 90 BPM | OXYGEN SATURATION: 99 % | SYSTOLIC BLOOD PRESSURE: 121 MMHG | TEMPERATURE: 98.7 F

## 2024-05-05 DIAGNOSIS — M25.50 ARTHRALGIA: Primary | ICD-10-CM

## 2024-05-05 LAB
ALBUMIN SERPL BCP-MCNC: 3.8 G/DL (ref 3.5–5)
ALP SERPL-CCNC: 87 U/L (ref 34–104)
ALT SERPL W P-5'-P-CCNC: 53 U/L (ref 7–52)
ANION GAP SERPL CALCULATED.3IONS-SCNC: 6 MMOL/L (ref 4–13)
AST SERPL W P-5'-P-CCNC: 31 U/L (ref 13–39)
BASOPHILS # BLD AUTO: 0.08 THOUSANDS/ÂΜL (ref 0–0.1)
BASOPHILS NFR BLD AUTO: 1 % (ref 0–1)
BILIRUB SERPL-MCNC: 0.26 MG/DL (ref 0.2–1)
BUN SERPL-MCNC: 13 MG/DL (ref 5–25)
CALCIUM SERPL-MCNC: 8.7 MG/DL (ref 8.4–10.2)
CHLORIDE SERPL-SCNC: 104 MMOL/L (ref 96–108)
CO2 SERPL-SCNC: 27 MMOL/L (ref 21–32)
CREAT SERPL-MCNC: 0.74 MG/DL (ref 0.6–1.3)
CRP SERPL QL: 21.5 MG/L
EOSINOPHIL # BLD AUTO: 0.1 THOUSAND/ÂΜL (ref 0–0.61)
EOSINOPHIL NFR BLD AUTO: 2 % (ref 0–6)
ERYTHROCYTE [DISTWIDTH] IN BLOOD BY AUTOMATED COUNT: 12.4 % (ref 11.6–15.1)
GFR SERPL CREATININE-BSD FRML MDRD: 117 ML/MIN/1.73SQ M
GLUCOSE SERPL-MCNC: 93 MG/DL (ref 65–140)
HCT VFR BLD AUTO: 39.6 % (ref 36.5–49.3)
HGB BLD-MCNC: 13.1 G/DL (ref 12–17)
IMM GRANULOCYTES # BLD AUTO: 0.06 THOUSAND/UL (ref 0–0.2)
IMM GRANULOCYTES NFR BLD AUTO: 1 % (ref 0–2)
LYMPHOCYTES # BLD AUTO: 1.2 THOUSANDS/ÂΜL (ref 0.6–4.47)
LYMPHOCYTES NFR BLD AUTO: 18 % (ref 14–44)
MCH RBC QN AUTO: 27.3 PG (ref 26.8–34.3)
MCHC RBC AUTO-ENTMCNC: 33.1 G/DL (ref 31.4–37.4)
MCV RBC AUTO: 83 FL (ref 82–98)
MONOCYTES # BLD AUTO: 0.48 THOUSAND/ÂΜL (ref 0.17–1.22)
MONOCYTES NFR BLD AUTO: 7 % (ref 4–12)
NEUTROPHILS # BLD AUTO: 4.86 THOUSANDS/ÂΜL (ref 1.85–7.62)
NEUTS SEG NFR BLD AUTO: 71 % (ref 43–75)
NRBC BLD AUTO-RTO: 0 /100 WBCS
PLATELET # BLD AUTO: 353 THOUSANDS/UL (ref 149–390)
PMV BLD AUTO: 9.5 FL (ref 8.9–12.7)
POTASSIUM SERPL-SCNC: 3.9 MMOL/L (ref 3.5–5.3)
PROCALCITONIN SERPL-MCNC: 0.09 NG/ML
PROT SERPL-MCNC: 7.1 G/DL (ref 6.4–8.4)
RBC # BLD AUTO: 4.79 MILLION/UL (ref 3.88–5.62)
SODIUM SERPL-SCNC: 137 MMOL/L (ref 135–147)
WBC # BLD AUTO: 6.78 THOUSAND/UL (ref 4.31–10.16)

## 2024-05-05 PROCEDURE — 84145 PROCALCITONIN (PCT): CPT | Performed by: EMERGENCY MEDICINE

## 2024-05-05 PROCEDURE — 36415 COLL VENOUS BLD VENIPUNCTURE: CPT | Performed by: EMERGENCY MEDICINE

## 2024-05-05 PROCEDURE — 80053 COMPREHEN METABOLIC PANEL: CPT | Performed by: EMERGENCY MEDICINE

## 2024-05-05 PROCEDURE — 86431 RHEUMATOID FACTOR QUANT: CPT | Performed by: EMERGENCY MEDICINE

## 2024-05-05 PROCEDURE — 99284 EMERGENCY DEPT VISIT MOD MDM: CPT | Performed by: EMERGENCY MEDICINE

## 2024-05-05 PROCEDURE — 86038 ANTINUCLEAR ANTIBODIES: CPT | Performed by: EMERGENCY MEDICINE

## 2024-05-05 PROCEDURE — 86617 LYME DISEASE ANTIBODY: CPT | Performed by: EMERGENCY MEDICINE

## 2024-05-05 PROCEDURE — 96372 THER/PROPH/DIAG INJ SC/IM: CPT

## 2024-05-05 PROCEDURE — 86140 C-REACTIVE PROTEIN: CPT | Performed by: EMERGENCY MEDICINE

## 2024-05-05 PROCEDURE — 86430 RHEUMATOID FACTOR TEST QUAL: CPT | Performed by: EMERGENCY MEDICINE

## 2024-05-05 PROCEDURE — 85025 COMPLETE CBC W/AUTO DIFF WBC: CPT | Performed by: EMERGENCY MEDICINE

## 2024-05-05 PROCEDURE — 86618 LYME DISEASE ANTIBODY: CPT | Performed by: EMERGENCY MEDICINE

## 2024-05-05 PROCEDURE — 99283 EMERGENCY DEPT VISIT LOW MDM: CPT

## 2024-05-05 RX ORDER — KETOROLAC TROMETHAMINE 30 MG/ML
30 INJECTION, SOLUTION INTRAMUSCULAR; INTRAVENOUS ONCE
Status: COMPLETED | OUTPATIENT
Start: 2024-05-05 | End: 2024-05-05

## 2024-05-05 RX ADMIN — KETOROLAC TROMETHAMINE 30 MG: 30 INJECTION, SOLUTION INTRAMUSCULAR; INTRAVENOUS at 16:23

## 2024-05-05 NOTE — ED PROVIDER NOTES
History  Chief Complaint   Patient presents with    Medical Problem     Patient presents to the ED with c/o hand swelling on Friday, states that he went camping on Thursday and woke up Friday with swelling and soreness in bilateral hands. States that it continued into Saturday where his feet and legs also began to get sore.      This is a 39-year-old male presents with 2-day history of arthralgias and swelling bilateral hands feet and some body achiness he was camping on Thursday does not have any circular rashes or embedded ticks upon examination no fever or chills.  No prior history of rheumatologic diagnosis      History provided by:  Patient  Medical Problem  Location:  Generalized  Quality:  Achiness and swelling hands and feet  Severity:  Moderate  Onset quality:  Gradual  Duration:  2 days  Timing:  Constant  Progression:  Waxing and waning  Chronicity:  New  Context:  Joint achiness and swelling over the past few days after camping on Thursday  Relieved by:  Advil  Associated symptoms: no rash        Prior to Admission Medications   Prescriptions Last Dose Informant Patient Reported? Taking?   LORazepam (ATIVAN) 0.5 mg tablet  Self No No   Sig: Take 1 tablet (0.5 mg total) by mouth every 8 (eight) hours as needed for anxiety   Patient not taking: Reported on 3/16/2021   acetaminophen (TYLENOL) 325 mg tablet  Self Yes No   Sig: Take 650 mg by mouth every 6 (six) hours as needed for mild pain   clotrimazole (LOTRIMIN) 1 % cream  Self No No   Sig: Apply topically 2 (two) times a day   Patient not taking: Reported on 3/16/2021   ferrous sulfate 324 (65 Fe) mg   No No   Sig: Take 1 tablet (324 mg total) by mouth 2 (two) times a day before meals   ibuprofen (MOTRIN) 600 mg tablet  Self No No   Sig: Take 1 tablet (600 mg total) by mouth every 6 (six) hours as needed for mild pain or moderate pain      Facility-Administered Medications: None       Past Medical History:   Diagnosis Date    Hernia, umbilical     Spina  bifida (HCC)        Past Surgical History:   Procedure Laterality Date    DENTAL SURGERY      HERNIA REPAIR Bilateral 2020    Procedure: REPAIR HERNIA INGUINAL, LAPAROSCOPIC;  Surgeon: Lawrence Fish MD;  Location: [facility];  Service: General    OR RPR UMBILICAL HRNA 5 YRS/> REDUCIBLE N/A 2020    Procedure: REPAIR HERNIA UMBILICAL;  Surgeon: Lawrence Fish MD;  Location: [facility];  Service: General    VASECTOMY         Family History   Problem Relation Age of Onset    Bipolar disorder Brother     Heart disease Maternal Grandmother     Hypertension Maternal Grandmother     Hypertension Maternal Grandfather     Heart disease Maternal Grandfather     Cancer Maternal Grandfather     Hypertension Paternal Grandmother     Heart disease Paternal Grandmother      I have reviewed and agree with the history as documented.    E-Cigarette/Vaping    E-Cigarette Use Former User     Comments 1 cartridge week x 6 months then quit      E-Cigarette/Vaping Substances    Flavoring Yes      Social History     Tobacco Use    Smoking status: Former     Current packs/day: 0.00     Average packs/day: 1 pack/day for 12.0 years (12.0 ttl pk-yrs)     Types: Cigarettes     Start date:      Quit date: 2013     Years since quittin.3    Smokeless tobacco: Never   Vaping Use    Vaping status: Former    Substances: Flavoring   Substance Use Topics    Alcohol use: Yes     Comment: social     Drug use: Yes     Types: Marijuana     Comment: daily        Review of Systems   Musculoskeletal:  Positive for arthralgias and joint swelling.   Skin:  Negative for rash.   All other systems reviewed and are negative.      Physical Exam  Physical Exam  Vitals and nursing note reviewed.   Constitutional:       General: He is not in acute distress.     Appearance: He is not ill-appearing, toxic-appearing or diaphoretic.   HENT:      Right Ear: External ear normal.      Left Ear: External ear normal.   Eyes:      Extraocular  Movements: Extraocular movements intact.      Pupils: Pupils are equal, round, and reactive to light.   Cardiovascular:      Rate and Rhythm: Normal rate and regular rhythm.      Pulses: Normal pulses.      Heart sounds: No murmur heard.     No friction rub. No gallop.   Pulmonary:      Effort: Pulmonary effort is normal. No respiratory distress.      Breath sounds: No stridor. No wheezing, rhonchi or rales.   Abdominal:      General: There is no distension.      Palpations: Abdomen is soft.      Tenderness: There is no abdominal tenderness. There is no guarding or rebound.   Musculoskeletal:         General: Swelling present. No deformity. Normal range of motion.      Cervical back: Normal range of motion and neck supple. No rigidity or tenderness.      Right lower leg: No edema.      Left lower leg: No edema.      Comments: Mild swelling and stiffness to the hands without any gross erythema warmth or rash   Skin:     General: Skin is warm and dry.      Coloration: Skin is not jaundiced.      Findings: No bruising, erythema, lesion or rash.      Comments: No bull's-eye rashes or embedded ticks on body examination   Neurological:      General: No focal deficit present.      Mental Status: He is alert and oriented to person, place, and time.      Cranial Nerves: No cranial nerve deficit.      Sensory: No sensory deficit.      Motor: No weakness.      Coordination: Coordination normal.      Gait: Gait normal.   Psychiatric:         Mood and Affect: Mood normal.         Behavior: Behavior normal.         Vital Signs  ED Triage Vitals   Temperature Pulse Respirations Blood Pressure SpO2   05/05/24 1623 05/05/24 1547 05/05/24 1547 05/05/24 1547 05/05/24 1547   98.7 °F (37.1 °C) 97 18 (!) 143/103 98 %      Temp Source Heart Rate Source Patient Position - Orthostatic VS BP Location FiO2 (%)   05/05/24 1623 05/05/24 1547 05/05/24 1547 05/05/24 1547 --   Temporal Monitor Sitting Left arm       Pain Score       05/05/24 1547        6           Vitals:    05/05/24 1547 05/05/24 1700   BP: (!) 143/103 121/85   Pulse: 97 90   Patient Position - Orthostatic VS: Sitting Sitting         Visual Acuity  Visual Acuity      Flowsheet Row Most Recent Value   L Pupil Size (mm) 3   R Pupil Size (mm) 3            ED Medications  Medications   ketorolac (TORADOL) injection 30 mg (30 mg Intramuscular Given 5/5/24 1623)       Diagnostic Studies  Results Reviewed       Procedure Component Value Units Date/Time    Procalcitonin [[id number]]  (Normal) Collected: 05/05/24 1623    Lab Status: Final result Specimen: Blood from Arm, Left Updated: 05/05/24 1653     Procalcitonin 0.09 ng/ml     Comprehensive metabolic panel [[id number]]  (Abnormal) Collected: 05/05/24 1623    Lab Status: Final result Specimen: Blood from Arm, Left Updated: 05/05/24 1642     Sodium 137 mmol/L      Potassium 3.9 mmol/L      Chloride 104 mmol/L      CO2 27 mmol/L      ANION GAP 6 mmol/L      BUN 13 mg/dL      Creatinine 0.74 mg/dL      Glucose 93 mg/dL      Calcium 8.7 mg/dL      AST 31 U/L      ALT 53 U/L      Alkaline Phosphatase 87 U/L      Total Protein 7.1 g/dL      Albumin 3.8 g/dL      Total Bilirubin 0.26 mg/dL      eGFR 117 ml/min/1.73sq m     Narrative:      [employer] guidelines for Chronic Kidney Disease (CKD):     Stage 1 with normal or high GFR (GFR > 90 mL/min/1.73 square meters)    Stage 2 Mild CKD (GFR = 60-89 mL/min/1.73 square meters)    Stage 3A Moderate CKD (GFR = 45-59 mL/min/1.73 square meters)    Stage 3B Moderate CKD (GFR = 30-44 mL/min/1.73 square meters)    Stage 4 Severe CKD (GFR = 15-29 mL/min/1.73 square meters)    Stage 5 End Stage CKD (GFR <15 mL/min/1.73 square meters)  Note: GFR calculation is accurate only with a steady state creatinine    C-reactive protein [[id number]]  (Abnormal) Collected: 05/05/24 1623    Lab Status: Final result Specimen: Blood from Arm, Left Updated: 05/05/24 1642     CRP 21.5 mg/L     CBC and  differential [[id number]] Collected: 05/05/24 1623    Lab Status: Final result Specimen: Blood from Arm, Left Updated: 05/05/24 1629     WBC 6.78 Thousand/uL      RBC 4.79 Million/uL      Hemoglobin 13.1 g/dL      Hematocrit 39.6 %      MCV 83 fL      MCH 27.3 pg      MCHC 33.1 g/dL      RDW 12.4 %      MPV 9.5 fL      Platelets 353 Thousands/uL      nRBC 0 /100 WBCs      Segmented % 71 %      Immature Grans % 1 %      Lymphocytes % 18 %      Monocytes % 7 %      Eosinophils Relative 2 %      Basophils Relative 1 %      Absolute Neutrophils 4.86 Thousands/µL      Absolute Immature Grans 0.06 Thousand/uL      Absolute Lymphocytes 1.20 Thousands/µL      Absolute Monocytes 0.48 Thousand/µL      Eosinophils Absolute 0.10 Thousand/µL      Basophils Absolute 0.08 Thousands/µL     Lyme Total AB W Reflex to IGM/IGG [[id number]] Collected: 05/05/24 1623    Lab Status: In process Specimen: Blood from Arm, Left Updated: 05/05/24 1626    Narrative:      The following orders were created for panel order Lyme Total AB W Reflex to IGM/IGG.  Procedure                               Abnormality         Status                     ---------                               -----------         ------                     Lyme Total AB W Reflex t...[[id number]]                      In process                   Please view results for these tests on the individual orders.    Lyme Total AB W Reflex to IGM/IGG [[id number]] Collected: 05/05/24 1623    Lab Status: In process Specimen: Blood from Arm, Left Updated: 05/05/24 1626    Rheumatoid factor screen [[id number]] Collected: 05/05/24 1623    Lab Status: In process Specimen: Blood from Arm, Left Updated: 05/05/24 1626    JODY Screen w/ Reflex to Titer/Pattern [[id number]] Collected: 05/05/24 1623    Lab Status: In process Specimen: Blood from Arm, Left Updated: 05/05/24 1626                   No orders to display              Procedures  Procedures         ED Course                                SBIRT 20yo+      Flowsheet Row Most Recent Value   Initial Alcohol Screen: US AUDIT-C     1. How often do you have a drink containing alcohol? 0 Filed at: 05/05/2024 1549   2. How many drinks containing alcohol do you have on a typical day you are drinking?  0 Filed at: 05/05/2024 1549   3a. Male UNDER 65: How often do you have five or more drinks on one occasion? 0 Filed at: 05/05/2024 1549   3b. FEMALE Any Age, or MALE 65+: How often do you have 4 or more drinks on one occassion? 0 Filed at: 05/05/2024 1549   Audit-C Score 0 Filed at: 05/05/2024 1549   RUBA: How many times in the past year have you...    Used an illegal drug or used a prescription medication for non-medical reasons? Never Filed at: 05/05/2024 1549                      Medical Decision Making  Urologist differential includes infection/tickborne versus inflammatory rheumatologic etiology will check lab work treat symptomatically will need follow-up with primary care physician or rheumatology    Amount and/or Complexity of Data Reviewed  Labs: ordered.    Risk  Prescription drug management.             Disposition  Final diagnoses:   Arthralgia     Time reflects when diagnosis was documented in both MDM as applicable and the Disposition within this note       Time User Action Codes Description Comment    5/5/2024  5:30 PM Alexandro Dailey Add [M25.50] Arthralgia           ED Disposition       ED Disposition   Discharge    Condition   Stable    Date/Time   Sun May 5, 2024 1729    Comment   Henri Morales discharge to home/self care.                   Follow-up Information       Follow up With Specialties Details Why Contact Info    [facility], Nurse Practitioner In 1 week  [id number]  [address] [id number] [phone]              Discharge Medication List as of 5/5/2024  5:32 PM        CONTINUE these medications which have NOT CHANGED    Details   acetaminophen (TYLENOL) 325 mg  tablet Take 650 mg by mouth every 6 (six) hours as needed for mild pain, Historical Med      clotrimazole (LOTRIMIN) 1 % cream Apply topically 2 (two) times a day, Starting Thu 6/25/2020, Normal      ferrous sulfate 324 (65 Fe) mg Take 1 tablet (324 mg total) by mouth 2 (two) times a day before meals, Starting Mon 3/22/2021, Normal      ibuprofen (MOTRIN) 600 mg tablet Take 1 tablet (600 mg total) by mouth every 6 (six) hours as needed for mild pain or moderate pain, Starting Tue 7/28/2020, Normal      LORazepam (ATIVAN) 0.5 mg tablet Take 1 tablet (0.5 mg total) by mouth every 8 (eight) hours as needed for anxiety, Starting Wed 1/8/2020, Normal             No discharge procedures on file.    PDMP Review         Value Time User    PDMP Reviewed  Yes 7/28/2020  8:22 AM Bisi Mike PA-C            ED Provider  Electronically Signed by             Alexandro Dailey DO  05/06/24 0037

## 2024-05-06 ENCOUNTER — OFFICE VISIT (OUTPATIENT)
Dept: FAMILY MEDICINE CLINIC | Facility: HOSPITAL | Age: 39
End: 2024-05-06
Payer: COMMERCIAL

## 2024-05-06 VITALS
HEIGHT: 67 IN | WEIGHT: 206 LBS | TEMPERATURE: 97.9 F | HEART RATE: 90 BPM | SYSTOLIC BLOOD PRESSURE: 138 MMHG | OXYGEN SATURATION: 97 % | RESPIRATION RATE: 16 BRPM | BODY MASS INDEX: 32.33 KG/M2 | DIASTOLIC BLOOD PRESSURE: 88 MMHG

## 2024-05-06 DIAGNOSIS — R79.89 ELEVATED LFTS: ICD-10-CM

## 2024-05-06 DIAGNOSIS — M13.0 POLYARTHRITIS: ICD-10-CM

## 2024-05-06 DIAGNOSIS — A69.20 ERYTHEMA MIGRANS (LYME DISEASE): Primary | ICD-10-CM

## 2024-05-06 LAB
ANA SER QL IA: NEGATIVE
B BURGDOR IGG SERPL QL IA: POSITIVE
B BURGDOR IGG+IGM SER QL IA: POSITIVE
B BURGDOR IGM SERPL QL IA: POSITIVE
CRYOGLOB RF SER-ACNC: ABNORMAL [IU]/ML
RHEUMATOID FACT SER QL LA: POSITIVE

## 2024-05-06 PROCEDURE — 99204 OFFICE O/P NEW MOD 45 MIN: CPT | Performed by: INTERNAL MEDICINE

## 2024-05-06 RX ORDER — DOXYCYCLINE 100 MG/1
100 CAPSULE ORAL 2 TIMES DAILY
Qty: 20 CAPSULE | Refills: 0 | Status: SHIPPED | OUTPATIENT
Start: 2024-05-06 | End: 2024-05-16

## 2024-05-06 RX ORDER — OMEGA-3 FATTY ACIDS/FISH OIL 300-1000MG
CAPSULE ORAL
COMMUNITY

## 2024-05-06 NOTE — ASSESSMENT & PLAN NOTE
Patient presents with a chief complaint of bilateral symmetrical joint swellings pain and stiffness.    He was in his usual state of health last Thursday but Friday morning he woke up with swelling, severe pain and stiffness of the joints of his hands, wrists, shoulders, knees, ankles.  He arrested, took Advil and took a hot shower.  He had a 2-hour car ride over the weekend and after that the joints felt tight, tense and painful.  He felt like he ran marathon after getting out of the car.  His hands and wrist joints were visibly swollen.  He also felt tightness in the chest when taking a deep breath.    He had a very slight headache but denies fevers, chills, neck pain, rash, shortness of breath, cough, exertional chest pain, abdominal pain, nausea, diarrhea, dysuria, difficulty urinating or syncope.    He is outdoors a lot, denies recent tick bites    He went to the ER yesterday where CBC showed no leukocytosis, anemia or thrombocytopenia.  CMP revealed normal electrolytes, ALT was 53, 1 unit above normal.  His ALT was 59 in 2019.    Rheumatoid factor was 32 and IgM and IgG for Lyme disease were both positive.  JODY was negative    CRP was 21 but it was 2021 as well.    Patient had no evidence of inflammatory arthritis of the joints of the hands, wrists, shoulders, knees or ankles or feet today.    He had a rash in the left inguinal area.    I am treating patient for a presumed early stage Lyme disease.  I told him that it was unlikely that he just started to have acute rheumatoid arthritis.    I will treat him with doxycycline for 10 days, repeat C-reactive protein, rheumatoid factor LFTs in 2 months.  I asked him to continue taking ibuprofen as needed.    I asked him to call if the joint swelling returns.  In that case we will consider referral to rheumatology

## 2024-05-06 NOTE — PROGRESS NOTES
Assessment/Plan:    Polyarthritis  Patient presents with a chief complaint of bilateral symmetrical joint swellings pain and stiffness.    He was in his usual state of health last Thursday but Friday morning he woke up with swelling, severe pain and stiffness of the joints of his hands, wrists, shoulders, knees, ankles.  He arrested, took Advil and took a hot shower.  He had a 2-hour car ride over the weekend and after that the joints felt tight, tense and painful.  He felt like he ran marathon after getting out of the car.  His hands and wrist joints were visibly swollen.  He also felt tightness in the chest when taking a deep breath.    He had a very slight headache but denies fevers, chills, neck pain, rash, shortness of breath, cough, exertional chest pain, abdominal pain, nausea, diarrhea, dysuria, difficulty urinating or syncope.    He is outdoors a lot, denies recent tick bites    He went to the ER yesterday where CBC showed no leukocytosis, anemia or thrombocytopenia.  CMP revealed normal electrolytes, ALT was 53, 1 unit above normal.  His ALT was 59 in 2019.    Rheumatoid factor was 32 and IgM and IgG for Lyme disease were both positive.  JODY was negative    CRP was 21 but it was 2021 as well.    Patient had no evidence of inflammatory arthritis of the joints of the hands, wrists, shoulders, knees or ankles or feet today.    He had a rash in the left inguinal area.    I am treating patient for a presumed early stage Lyme disease.  I told him that it was unlikely that he just started to have acute rheumatoid arthritis.    I will treat him with doxycycline for 10 days, repeat C-reactive protein, rheumatoid factor LFTs in 2 months.  I asked him to continue taking ibuprofen as needed.    I asked him to call if the joint swelling returns.  In that case we will consider referral to rheumatology    Erythema migrans (Lyme disease)  I found the rash in the left inguinal area that could be erythema migrans  "related.  Will treat patient for presumed early stage Lyme disease        Elevated LFTs  ALT was 1 unit above normal.  His abdomen is soft, nontender I will recheck his LFTs in 2 months     Diagnoses and all orders for this visit:    Erythema migrans (Lyme disease)  -     doxycycline monohydrate (MONODOX) 100 mg capsule; Take 1 capsule (100 mg total) by mouth 2 (two) times a day for 10 days    Polyarthritis  -     doxycycline monohydrate (MONODOX) 100 mg capsule; Take 1 capsule (100 mg total) by mouth 2 (two) times a day for 10 days  -     C-reactive protein; Future  -     Rheumatoid Factor; Future    Elevated LFTs  -     Hepatic function panel; Future    Other orders  -     Ibuprofen 200 MG CAPS; Take by mouth 1-2 caps as needed          Subjective:      Patient ID: Henri Morales is a 39 y.o. male.      HPI    Patient presents for follow-up of chronic conditions as detailed in the assessment and plan.      The following portions of the patient's history were reviewed and updated as appropriate: current medications, past family history, past medical history, past social history, past surgical history and problem list.    Review of Systems      Objective:    /88   Pulse 90   Temp 97.9 °F (36.6 °C) (Tympanic)   Resp 16   Ht 5' 7\" (1.702 m)   Wt 93.4 kg (206 lb)   SpO2 97%   BMI 32.26 kg/m²      Physical Exam  Constitutional:       General: He is not in acute distress.     Appearance: He is not ill-appearing or toxic-appearing.   HENT:      Head: Normocephalic.   Cardiovascular:      Rate and Rhythm: Normal rate and regular rhythm.      Heart sounds: No murmur heard.     No gallop.   Pulmonary:      Effort: No respiratory distress.      Breath sounds: No wheezing or rales.   Abdominal:      General: Bowel sounds are normal.      Palpations: Abdomen is soft. There is no mass.      Tenderness: There is no abdominal tenderness.   Musculoskeletal:         General: No swelling or tenderness.   Skin:     " Findings: Rash present.   Neurological:      Mental Status: He is alert.      Cranial Nerves: No cranial nerve deficit.      Motor: No weakness.      Gait: Gait normal.   Psychiatric:         Mood and Affect: Mood normal.           Depression Screening and Follow-up Plan: Patient was screened for depression during today's encounter. They screened negative with a PHQ-2 score of 0.        Jose Roberto Bourgeois MD

## 2024-05-06 NOTE — ASSESSMENT & PLAN NOTE
I found the rash in the left inguinal area that could be erythema migrans related.  Will treat patient for presumed early stage Lyme disease

## 2024-05-15 ENCOUNTER — TELEPHONE (OUTPATIENT)
Age: 39
End: 2024-05-15

## 2024-05-15 NOTE — TELEPHONE ENCOUNTER
Pt called, reports seen in ED and by Dr Bourgeois for the same. Bodyaches and weakness with recent dx Lymes disease. Almost finished with antibiotic that was prescribed 9 days ago. Pt reports generalized  bodyaches are worse. Advil and tylenol only taking the edge off. Pt finds it difficult to ambulate at times due to pain. Has 2 month follow up appointment.     Pt would like to be seen sooner. Pt scheduled for next available office appointment, 5/20/24. Pt encouraged to go to  Urgent Care if he feels that he needs to be seen sooner or to Emergency Department with worsening of symptoms. Pt is agreeable with plan.     Pt would like a call back from Dr Bourgeois to advise of anything he can do in the meantime for the aches. Please also advise of any cancellations/schedule openings before 5/20/24. Thank you.

## 2024-05-16 ENCOUNTER — OFFICE VISIT (OUTPATIENT)
Dept: FAMILY MEDICINE CLINIC | Facility: HOSPITAL | Age: 39
End: 2024-05-16
Payer: COMMERCIAL

## 2024-05-16 VITALS
WEIGHT: 201 LBS | OXYGEN SATURATION: 97 % | TEMPERATURE: 98.7 F | SYSTOLIC BLOOD PRESSURE: 128 MMHG | BODY MASS INDEX: 31.55 KG/M2 | DIASTOLIC BLOOD PRESSURE: 92 MMHG | HEIGHT: 67 IN | HEART RATE: 89 BPM | RESPIRATION RATE: 16 BRPM

## 2024-05-16 DIAGNOSIS — M79.10 MYALGIA: ICD-10-CM

## 2024-05-16 DIAGNOSIS — R79.89 ELEVATED LFTS: ICD-10-CM

## 2024-05-16 DIAGNOSIS — M13.0 POLYARTHRITIS: Primary | ICD-10-CM

## 2024-05-16 PROCEDURE — 99214 OFFICE O/P EST MOD 30 MIN: CPT | Performed by: INTERNAL MEDICINE

## 2024-05-16 RX ORDER — DICLOFENAC SODIUM 75 MG/1
75 TABLET, DELAYED RELEASE ORAL 2 TIMES DAILY
Qty: 30 TABLET | Refills: 1 | Status: SHIPPED | OUTPATIENT
Start: 2024-05-16

## 2024-05-16 NOTE — PATIENT INSTRUCTIONS
Discontinue taking ibuprofen  You can still continue taking acetaminophen 1000 mg 3 times a day along with the diclofenac 75 mg twice a day with meals!

## 2024-05-16 NOTE — PROGRESS NOTES
Assessment/Plan:    Polyarthritis  Patient presents for follow-up with his wife.  I saw him 10 days ago.  His illness started about 2 weeks ago after a camping trip.    He complains of severe stiffness, tense feelings of the muscles of the neck, chest, back, thighs as well as stiffness of the shoulders, elbows, wrists and joints of the hands.  The above symptoms get worse when he rests and he tries to stand up and tries to walk.  He cannot get comfortable at night because of the muscle stiffness, tightness in the joint pains.    He has been taking Tylenol up to 3000 mg a day and ibuprofen 400-600 mg up to 4 times a day without help.    He has had episodes of muscle tenseness and tightness before, he saw physical therapy and has been doing the exercises and stretches he learned from physical therapy before.  He never had symptoms lasting this long ago    He denies headache, fever, photophobia, new rash, diarrhea, dysuria, penile discharge, history of psoriasis,, ulcers, eye pain, cough, shortness of breath, pleurisy,, focal weakness or numbness.  The muscles of his chest to get tight and tense with resting    His symptoms get better after he starts moving around.  According to his wife he walks like an 86-year-old man    Labs on May 5 were significant for minimally increased AST, abnormal Lyme titer with IgM being positive, rheumatoid factor being elevated.  JODY was negative.  CBC was normal    I treated him for possible early Lyme's disease and today's last day of doxycycline therapy, the 10th day of treatment.    He had decreased range of motion in the shoulders elevating his arms due to stiffness but I found no signs of inflammatory arthritis in the shoulder, elbows, wrists, joints of the hands, knees, ankles.  The muscles of the cervical spine, upper back, upper chest were tender on palpation.  The left inguinal rash that I thought was erythema migrans resolved by today and he had no new rashes    Wife told me  that their children had fifth disease about a month ago.    I am reassessing patient because he is not improving on doxycycline and NSAIDs.    I ordered provide has been 19 titers to see if he could have acute infection.  I will recheck his ESR, CRP to see if his infiltrate markers are increasing. Could he have PMR?  Will recheck his LFTs and CBC see if he developed thrombocytopenia suggesting that he could have anaplasmosis.  I will recheck his rheumatoid factor to see if that is worsening.  I tried to order anti-CCP antibodies but I could not find it in [facility].    I ordered CPK and aldolase to see if he could have myositis    I am trying diclofenac 75 mg twice daily in addition to as needed Tylenol to see if it decreases inflammation more.    I requested a rheumatological consult.    I will reassess patient in 2 weeks    Will consider prednisone taper if diclofenac is not helping         Diagnoses and all orders for this visit:    Polyarthritis  -     CBC and differential; Future  -     Sedimentation rate, automated; Future  -     C-reactive protein; Future  -     Rheumatoid Arthritis Factor; Future  -     Parvovirus B19 antibody, IgG and IgM; Future  -     diclofenac (VOLTAREN) 75 mg EC tablet; Take 1 tablet (75 mg total) by mouth 2 (two) times a day  -     Ambulatory Referral to Rheumatology; Future    Elevated LFTs  -     Comprehensive metabolic panel; Future    Myalgia  -     CK; Future  -     Parvovirus B19 antibody, IgG and IgM; Future  -     Aldolase; Future  -     Ambulatory Referral to Rheumatology; Future          Subjective:      Patient ID: Henri Morales is a 39 y.o. male.      HPI    Patient presents for follow-up of chronic conditions as detailed in the assessment and plan.      The following portions of the patient's history were reviewed and updated as appropriate: current medications, past family history, past medical history, past social history, past surgical history and problem  "list.    Review of Systems      Objective:    /92   Pulse 89   Temp 98.7 °F (37.1 °C) (Tympanic)   Resp 16   Ht 5' 7\" (1.702 m)   Wt 91.2 kg (201 lb)   SpO2 97%   BMI 31.48 kg/m²      Physical Exam  Constitutional:       General: He is not in acute distress.     Appearance: He is not toxic-appearing.   HENT:      Head: Normocephalic.   Eyes:      Conjunctiva/sclera: Conjunctivae normal.   Cardiovascular:      Rate and Rhythm: Normal rate and regular rhythm.      Heart sounds: No murmur heard.  Pulmonary:      Effort: No respiratory distress.      Breath sounds: No wheezing.   Abdominal:      General: Bowel sounds are normal. There is no distension.      Palpations: Abdomen is soft. There is no mass.      Tenderness: There is no abdominal tenderness.   Musculoskeletal:         General: Tenderness present. No swelling.      Cervical back: Neck supple.   Skin:     Findings: No rash.   Neurological:      Mental Status: He is alert.      Cranial Nerves: No cranial nerve deficit.      Motor: No weakness.             Jose Roberto Bourgeois MD  "

## 2024-05-16 NOTE — ASSESSMENT & PLAN NOTE
Patient presents for follow-up with his wife.  I saw him 10 days ago.  His illness started about 2 weeks ago after a camping trip.    He complains of severe stiffness, tense feelings of the muscles of the neck, chest, back, thighs as well as stiffness of the shoulders, elbows, wrists and joints of the hands.  The above symptoms get worse when he rests and he tries to stand up and tries to walk.  He cannot get comfortable at night because of the muscle stiffness, tightness in the joint pains.    He has been taking Tylenol up to 3000 mg a day and ibuprofen 400-600 mg up to 4 times a day without help.    He has had episodes of muscle tenseness and tightness before, he saw physical therapy and has been doing the exercises and stretches he learned from physical therapy before.  He never had symptoms lasting this long ago    He denies headache, fever, photophobia, new rash, diarrhea, dysuria, penile discharge, history of psoriasis,, ulcers, eye pain, cough, shortness of breath, pleurisy,, focal weakness or numbness.  The muscles of his chest to get tight and tense with resting    His symptoms get better after he starts moving around.  According to his wife he walks like an 86-year-old man    Labs on May 5 were significant for minimally increased AST, abnormal Lyme titer with IgM being positive, rheumatoid factor being elevated.  JODY was negative.  CBC was normal    I treated him for possible early Lyme's disease and today's last day of doxycycline therapy, the 10th day of treatment.    He had decreased range of motion in the shoulders elevating his arms due to stiffness but I found no signs of inflammatory arthritis in the shoulder, elbows, wrists, joints of the hands, knees, ankles.  The muscles of the cervical spine, upper back, upper chest were tender on palpation.  The left inguinal rash that I thought was erythema migrans resolved by today and he had no new rashes    Wife told me that their children had fifth disease  about a month ago.    I am reassessing patient because he is not improving on doxycycline and NSAIDs.    I ordered provide has been 19 titers to see if he could have acute infection.  I will recheck his ESR, CRP to see if his infiltrate markers are increasing. Could he have PMR?  Will recheck his LFTs and CBC see if he developed thrombocytopenia suggesting that he could have anaplasmosis.  I will recheck his rheumatoid factor to see if that is worsening.  I tried to order anti-CCP antibodies but I could not find it in [facility].    I ordered CPK and aldolase to see if he could have myositis    I am trying diclofenac 75 mg twice daily in addition to as needed Tylenol to see if it decreases inflammation more.    I requested a rheumatological consult.    I will reassess patient in 2 weeks    Will consider prednisone taper if diclofenac is not helping

## 2024-05-18 LAB
ALBUMIN SERPL-MCNC: 4.3 G/DL (ref 4.1–5.1)
ALBUMIN/GLOB SERPL: 1.4 {RATIO} (ref 1.2–2.2)
ALDOLASE SERPL-CCNC: 3.6 U/L (ref 3.3–10.3)
ALP SERPL-CCNC: 125 IU/L (ref 44–121)
ALT SERPL-CCNC: 59 IU/L (ref 0–44)
AST SERPL-CCNC: 37 IU/L (ref 0–40)
B19V IGG SER IA-ACNC: 5 INDEX (ref 0–0.8)
B19V IGM SER IA-ACNC: 15.9 INDEX (ref 0–0.8)
BASOPHILS # BLD AUTO: 0.1 X10E3/UL (ref 0–0.2)
BASOPHILS NFR BLD AUTO: 1 %
BILIRUB SERPL-MCNC: 0.5 MG/DL (ref 0–1.2)
BUN SERPL-MCNC: 6 MG/DL (ref 6–20)
BUN/CREAT SERPL: 9 (ref 9–20)
CALCIUM SERPL-MCNC: 9.5 MG/DL (ref 8.7–10.2)
CHLORIDE SERPL-SCNC: 99 MMOL/L (ref 96–106)
CK SERPL-CCNC: 48 U/L (ref 49–439)
CO2 SERPL-SCNC: 24 MMOL/L (ref 20–29)
CREAT SERPL-MCNC: 0.69 MG/DL (ref 0.76–1.27)
CRP SERPL-MCNC: 47 MG/L (ref 0–10)
EGFR: 121 ML/MIN/1.73
EOSINOPHIL # BLD AUTO: 0.1 X10E3/UL (ref 0–0.4)
EOSINOPHIL NFR BLD AUTO: 2 %
ERYTHROCYTE [DISTWIDTH] IN BLOOD BY AUTOMATED COUNT: 13.2 % (ref 11.6–15.4)
ERYTHROCYTE [SEDIMENTATION RATE] IN BLOOD BY WESTERGREN METHOD: 20 MM/HR (ref 0–15)
GLOBULIN SER-MCNC: 3.1 G/DL (ref 1.5–4.5)
GLUCOSE SERPL-MCNC: 94 MG/DL (ref 70–99)
HCT VFR BLD AUTO: 43.9 % (ref 37.5–51)
HGB BLD-MCNC: 14.4 G/DL (ref 13–17.7)
IMM GRANULOCYTES # BLD: 0 X10E3/UL (ref 0–0.1)
IMM GRANULOCYTES NFR BLD: 1 %
LYMPHOCYTES # BLD AUTO: 1.7 X10E3/UL (ref 0.7–3.1)
LYMPHOCYTES NFR BLD AUTO: 26 %
MCH RBC QN AUTO: 27.2 PG (ref 26.6–33)
MCHC RBC AUTO-ENTMCNC: 32.8 G/DL (ref 31.5–35.7)
MCV RBC AUTO: 83 FL (ref 79–97)
MONOCYTES # BLD AUTO: 0.7 X10E3/UL (ref 0.1–0.9)
MONOCYTES NFR BLD AUTO: 11 %
NEUTROPHILS # BLD AUTO: 3.8 X10E3/UL (ref 1.4–7)
NEUTROPHILS NFR BLD AUTO: 59 %
PLATELET # BLD AUTO: 445 X10E3/UL (ref 150–450)
POTASSIUM SERPL-SCNC: 4.4 MMOL/L (ref 3.5–5.2)
PROT SERPL-MCNC: 7.4 G/DL (ref 6–8.5)
RBC # BLD AUTO: 5.3 X10E6/UL (ref 4.14–5.8)
RHEUMATOID FACT SERPL-ACNC: <10 IU/ML
SODIUM SERPL-SCNC: 139 MMOL/L (ref 134–144)
WBC # BLD AUTO: 6.4 X10E3/UL (ref 3.4–10.8)

## 2024-05-20 DIAGNOSIS — M13.0 POLYARTHRITIS: Primary | ICD-10-CM

## 2024-05-20 RX ORDER — PREDNISONE 20 MG/1
20 TABLET ORAL DAILY
Qty: 14 TABLET | Refills: 0 | Status: SHIPPED | OUTPATIENT
Start: 2024-05-20

## 2024-05-31 ENCOUNTER — TELEPHONE (OUTPATIENT)
Age: 39
End: 2024-05-31

## 2024-06-03 DIAGNOSIS — M13.0 POLYARTHRITIS: Primary | ICD-10-CM

## 2024-06-03 DIAGNOSIS — R79.89 ELEVATED LFTS: ICD-10-CM

## 2024-06-07 LAB — ERYTHROCYTE [SEDIMENTATION RATE] IN BLOOD BY WESTERGREN METHOD: 12 MM/HR (ref 0–15)

## 2024-06-11 DIAGNOSIS — R79.89 ELEVATED LFTS: ICD-10-CM

## 2024-06-11 DIAGNOSIS — M13.0 POLYARTHRITIS: Primary | ICD-10-CM

## (undated) DEVICE — SUT MONOCRYL 4-0 PS-2 27 IN Y426H

## (undated) DEVICE — TUBING SUCTION 5MM X 12 FT

## (undated) DEVICE — ELECTRODE BLADE MOD E-Z CLEAN 2.5IN 6.4CM -0012M

## (undated) DEVICE — SCD SEQUENTIAL COMPRESSION COMFORT SLEEVE MEDIUM KNEE LENGTH: Brand: KENDALL SCD

## (undated) DEVICE — SUT PROLENE 2-0 CT-2 30 IN 8411H

## (undated) DEVICE — ENDOPATH 5MM CURVED SCISSORS WITH MONOPOLAR CAUTERY: Brand: ENDOPATH

## (undated) DEVICE — INTENDED FOR TISSUE SEPARATION, AND OTHER PROCEDURES THAT REQUIRE A SHARP SURGICAL BLADE TO PUNCTURE OR CUT.: Brand: BARD-PARKER SAFETY BLADES SIZE 11, STERILE

## (undated) DEVICE — NEEDLE 25G X 1 1/2

## (undated) DEVICE — ENDOPATH 5MM ENDOSCOPIC BLUNT TIP DISSECTORS (12 POUCHES CONTAINING 3 DISSECTORS EACH): Brand: ENDOPATH

## (undated) DEVICE — GLOVE SRG BIOGEL ECLIPSE 8

## (undated) DEVICE — SUT VICRYL 3-0 SH 27 IN J416H

## (undated) DEVICE — DRAPE EQUIPMENT RF WAND

## (undated) DEVICE — MEDI-VAC YANKAUER SUCTION HANDLE W/BULBOUS AND CONTROL VENT: Brand: CARDINAL HEALTH

## (undated) DEVICE — TROCAR: Brand: KII® SLEEVE

## (undated) DEVICE — VIAL DECANTER

## (undated) DEVICE — 2000CC GUARDIAN II: Brand: GUARDIAN

## (undated) DEVICE — BETHLEHEM UNIVERSAL MINOR GEN: Brand: CARDINAL HEALTH

## (undated) DEVICE — BLUE HEAT SCOPE WARMER

## (undated) DEVICE — INTENDED FOR TISSUE SEPARATION, AND OTHER PROCEDURES THAT REQUIRE A SHARP SURGICAL BLADE TO PUNCTURE OR CUT.: Brand: BARD-PARKER SAFETY BLADES SIZE 15, STERILE

## (undated) DEVICE — PAD GROUNDING ADULT

## (undated) DEVICE — SYRINGE CATH TIP 50ML

## (undated) DEVICE — TROCAR: Brand: KII FIOS FIRST ENTRY

## (undated) DEVICE — SYRINGE 10ML LL

## (undated) DEVICE — TRAY FOLEY 16FR URIMETER SURESTEP

## (undated) DEVICE — GLOVE SRG BIOGEL 6.5

## (undated) DEVICE — ADHESIVE SKIN HIGH VISCOSITY EXOFIN 1ML

## (undated) DEVICE — GLOVE INDICATOR PI UNDERGLOVE SZ 8 BLUE

## (undated) DEVICE — ALLENTOWN LAP CHOLE APP PACK: Brand: CARDINAL HEALTH

## (undated) DEVICE — CHLORAPREP HI-LITE 26ML ORANGE

## (undated) DEVICE — ABSORBABLE WOUND CLOSURE DEVICE: Brand: V-LOC 90

## (undated) DEVICE — SUT VICRYL 0 UR-6 27 IN J603H

## (undated) DEVICE — PENCIL ELECTROSURG E-Z CLEAN -0035H

## (undated) DEVICE — TROCARS: Brand: KII® BALLOON BLUNT TIP SYSTEM

## (undated) DEVICE — GLOVE INDICATOR PI UNDERGLOVE SZ 6.5 BLUE